# Patient Record
Sex: MALE | Race: WHITE | NOT HISPANIC OR LATINO | Employment: FULL TIME | URBAN - METROPOLITAN AREA
[De-identification: names, ages, dates, MRNs, and addresses within clinical notes are randomized per-mention and may not be internally consistent; named-entity substitution may affect disease eponyms.]

---

## 2019-09-09 ENCOUNTER — APPOINTMENT (OUTPATIENT)
Dept: ULTRASOUND IMAGING | Facility: CLINIC | Age: 29
End: 2019-09-09
Attending: EMERGENCY MEDICINE
Payer: MEDICAID

## 2019-09-09 ENCOUNTER — HOSPITAL ENCOUNTER (EMERGENCY)
Facility: CLINIC | Age: 29
Discharge: HOME | End: 2019-09-09
Attending: EMERGENCY MEDICINE | Admitting: EMERGENCY MEDICINE
Payer: MEDICAID

## 2019-09-09 VITALS
HEART RATE: 73 BPM | RESPIRATION RATE: 16 BRPM | OXYGEN SATURATION: 95 % | WEIGHT: 186.1 LBS | DIASTOLIC BLOOD PRESSURE: 73 MMHG | TEMPERATURE: 98.6 F | HEIGHT: 75 IN | BODY MASS INDEX: 23.14 KG/M2 | SYSTOLIC BLOOD PRESSURE: 134 MMHG

## 2019-09-09 DIAGNOSIS — L03.113 CELLULITIS OF RIGHT UPPER EXTREMITY: ICD-10-CM

## 2019-09-09 LAB
ANION GAP SERPL CALCULATED.3IONS-SCNC: 6 MMOL/L (ref 3–14)
BASOPHILS # BLD AUTO: 0.1 10E9/L (ref 0–0.2)
BASOPHILS NFR BLD AUTO: 0.8 %
BUN SERPL-MCNC: 29 MG/DL (ref 7–30)
CALCIUM SERPL-MCNC: 9 MG/DL (ref 8.5–10.1)
CHLORIDE SERPL-SCNC: 109 MMOL/L (ref 94–109)
CO2 SERPL-SCNC: 27 MMOL/L (ref 20–32)
CREAT SERPL-MCNC: 1.01 MG/DL (ref 0.66–1.25)
CRP SERPL-MCNC: <2.9 MG/L (ref 0–8)
DIFFERENTIAL METHOD BLD: ABNORMAL
EOSINOPHIL # BLD AUTO: 0.1 10E9/L (ref 0–0.7)
EOSINOPHIL NFR BLD AUTO: 1.2 %
ERYTHROCYTE [DISTWIDTH] IN BLOOD BY AUTOMATED COUNT: 13.4 % (ref 10–15)
GFR SERPL CREATININE-BSD FRML MDRD: >90 ML/MIN/{1.73_M2}
GLUCOSE SERPL-MCNC: 91 MG/DL (ref 70–99)
HCT VFR BLD AUTO: 40.7 % (ref 40–53)
HGB BLD-MCNC: 13.3 G/DL (ref 13.3–17.7)
IMM GRANULOCYTES # BLD: 0 10E9/L (ref 0–0.4)
IMM GRANULOCYTES NFR BLD: 0.4 %
LACTATE BLD-SCNC: 1 MMOL/L (ref 0.7–2)
LYMPHOCYTES # BLD AUTO: 1.2 10E9/L (ref 0.8–5.3)
LYMPHOCYTES NFR BLD AUTO: 12.1 %
MCH RBC QN AUTO: 31 PG (ref 26.5–33)
MCHC RBC AUTO-ENTMCNC: 32.7 G/DL (ref 31.5–36.5)
MCV RBC AUTO: 95 FL (ref 78–100)
MONOCYTES # BLD AUTO: 0.9 10E9/L (ref 0–1.3)
MONOCYTES NFR BLD AUTO: 9.3 %
NEUTROPHILS # BLD AUTO: 7.6 10E9/L (ref 1.6–8.3)
NEUTROPHILS NFR BLD AUTO: 76.2 %
NRBC # BLD AUTO: 0 10*3/UL
NRBC BLD AUTO-RTO: 0 /100
PLATELET # BLD AUTO: 287 10E9/L (ref 150–450)
POTASSIUM SERPL-SCNC: 3.8 MMOL/L (ref 3.4–5.3)
RBC # BLD AUTO: 4.29 10E12/L (ref 4.4–5.9)
SODIUM SERPL-SCNC: 142 MMOL/L (ref 133–144)
WBC # BLD AUTO: 10 10E9/L (ref 4–11)

## 2019-09-09 PROCEDURE — 80048 BASIC METABOLIC PNL TOTAL CA: CPT | Performed by: EMERGENCY MEDICINE

## 2019-09-09 PROCEDURE — 99283 EMERGENCY DEPT VISIT LOW MDM: CPT | Mod: Z6 | Performed by: EMERGENCY MEDICINE

## 2019-09-09 PROCEDURE — 86140 C-REACTIVE PROTEIN: CPT | Performed by: EMERGENCY MEDICINE

## 2019-09-09 PROCEDURE — 93971 EXTREMITY STUDY: CPT | Mod: RT

## 2019-09-09 PROCEDURE — 85025 COMPLETE CBC W/AUTO DIFF WBC: CPT | Performed by: EMERGENCY MEDICINE

## 2019-09-09 PROCEDURE — 83605 ASSAY OF LACTIC ACID: CPT | Performed by: EMERGENCY MEDICINE

## 2019-09-09 PROCEDURE — 99284 EMERGENCY DEPT VISIT MOD MDM: CPT | Mod: 25 | Performed by: EMERGENCY MEDICINE

## 2019-09-09 RX ORDER — CLINDAMYCIN HCL 300 MG
300 CAPSULE ORAL 4 TIMES DAILY
Qty: 40 CAPSULE | Refills: 0 | Status: SHIPPED | OUTPATIENT
Start: 2019-09-09 | End: 2019-09-19

## 2019-09-09 ASSESSMENT — MIFFLIN-ST. JEOR: SCORE: 1889.77

## 2019-09-09 ASSESSMENT — ENCOUNTER SYMPTOMS
COLOR CHANGE: 1
FEVER: 0
WOUND: 1
SHORTNESS OF BREATH: 0
COUGH: 0
CHILLS: 0
JOINT SWELLING: 0

## 2019-09-09 NOTE — ED TRIAGE NOTES
"Triage Assessment & Note:    BP (!) 153/84   Pulse 70   Temp 98.6  F (37  C) (Oral)   Resp 18   Ht 1.905 m (6' 3\")   Wt 84.4 kg (186 lb 1.6 oz)   SpO2 95%   BMI 23.26 kg/m      Patient presents with: PT C/o pain,swelling, redness to right fore arm after a small scratch yesterday. PT does have a hx cellulitis in his left arm distant history.     Home Treatments/Remedies: None    Febrile / Afebrile? Afebrile     Duration of C/o:  1 days     Tacos Burton RN  September 9, 2019      "

## 2019-09-09 NOTE — DISCHARGE INSTRUCTIONS
Please make an appointment to follow up with [facility] (phone: [phone] or [facility] (phone: [phone]) in 10 days as needed.    Take clindamycin as prescribed for infection.  If you have any worsening symptoms including increased redness, fevers, vomiting, joint pain or other concerns, return to the emergency department for re-evaluation.

## 2019-09-09 NOTE — ED PROVIDER NOTES
[place] EMERGENCY DEPARTMENT ([facility])  9/09/19 Vertical Triage B 12:00 PM   History     Chief Complaint   Patient presents with     Wound Infection     The history is provided by the patient and medical records.     Lance Pacheco is a 29 year old male who presents with right upper extremity swelling and pain that started last night. Patient states he was helping to clear a parking lot of debris and may have gotten scratched by some sticks and twigs. He has a superficial scratch on his forearm with this.  When he woke up this morning he noticed a large palm sized area of redness and swelling over the distal dorsal surface his right forearm. He notes prior history of staph infection in the past for which he was treated with IV antibiotics and states this is feeling similar.  The staph infection was treated in a healthcare facility on the [facility]. No tick bites. No fevers, trauma, chest pain or shortness of breath.  Denies any pain in the wrist or elbow with movement or decreased ROM.  He does have a prior history of IV drug use but denies any recent use or injections in this area.    Per [facility] records his last Tdap was in 2013.    I have reviewed the Medications, Allergies, Past Medical and Surgical History, and Social History in the indidebt system.  Past Medical History:   Diagnosis Date     Depressive disorder      Night terrors      Sleep disorder      Substance abuse     Treatment right before 12/25/12.       Past Surgical History:   Procedure Laterality Date     ENT SURGERY  2009    abcessed tooth     ORTHOPEDIC SURGERY  2010    Right wrist       No family history on file.    Social History     Tobacco Use     Smoking status: Current Every Day Smoker     Packs/day: 1.00     Smokeless tobacco: Former User   Substance Use Topics     Alcohol use: Yes     Comment: Occasional  2 drinks per week      Review of Systems   Constitutional: Negative for chills and fever.   Respiratory: Negative for cough  "and shortness of breath.    Cardiovascular: Negative for chest pain.   Musculoskeletal: Negative for joint swelling.   Skin: Positive for color change and wound.   All other systems reviewed and are negative.      Physical Exam   BP: (!) 153/84  Pulse: 70  Temp: 98.6  F (37  C)  Resp: 18  Height: 190.5 cm (6' 3\")  Weight: 84.4 kg (186 lb 1.6 oz)  SpO2: 95 %      Physical Exam   Musculoskeletal:        Arms:    General: patient is alert and oriented and in no acute distress   Head: atraumatic and normocephalic   EENT: moist mucus membranes, pupils round and reactive, sclera anicteric  Neck: supple   Cardiovascular: regular rate and rhythm, extremities warm and well perfused, no lower extremity edema, 2+ radial pulses  Pulmonary: No respiratory distress or increased work of breathing   musculoskeletal: Full range of motion of the digits of the right hand, wrist and elbow, no joint swelling redness or warmth  Neurological: alert and oriented, moving all extremities symmetrically, gait normal   Skin: warm, dry, small area of erythema along the dorsal surface of the right forearm with associated swelling and small scab    ED Course        Procedures             Critical Care time:  none             Labs Ordered and Resulted from Time of ED Arrival Up to the Time of Departure from the ED - No data to display         Assessments & Plan (with Medical Decision Making)   Mr. Pacheco is a right-hand-dominant, otherwise healthy 29 year old male who presents with right forearm redness and swelling.  He is hemodynamically stable and afebrile.  He does have full range of motion of the wrist and elbow without evidence of septic arthritis.  On exam he does have a very small abrasion in the right forearm and most likely secondary cellulitis.  He does have a history of IV drug use but denies any IV drug use or injections at this site.  He is otherwise feeling well without systemic signs of infection.  White count is within normal " limits with no elevation in CRP or lactate.  He did go for an ultrasound which shows no evidence of thrombus or abscess.  On chart review he does have a history of MRSA which was susceptible to clindamycin.  Will plan to treat with oral clindamycin.  Area of redness outlined and patient given close return precautions for any worsening symptoms and voiced understanding.         I have reviewed the nursing notes.    I have reviewed the findings, diagnosis, plan and need for follow up with the patient.    New Prescriptions    CLINDAMYCIN (CLEOCIN) 300 MG CAPSULE    Take 1 capsule (300 mg) by mouth 4 times daily for 10 days       Final diagnoses:   Cellulitis of right upper extremity     I, Ana Paul, am serving as a trained medical scribe to document services personally performed by Nasra Howard MD based on the provider's statements to me on September 9, 2019.  This document has been checked and approved by the attending provider.    I, Nasra Howard MD, was physically present and have reviewed and verified the accuracy of this note documented by Ana Paul, medical scribe.         9/9/2019   [facility], [place], EMERGENCY DEPARTMENT     Nasra Howard MD  09/09/19 1410       Nasra Howard MD  09/09/19 1411

## 2019-09-09 NOTE — ED AVS SNAPSHOT
[facility], [place], Emergency Department  [address] [place] [address]  Phone:  [phone]                                    Lance Pacheco   MRN: [id number]    Department:  [facility], Emergency Department   Date of Visit:  9/9/2019           After Visit Summary Signature Page    I have received my discharge instructions, and my questions have been answered. I have discussed any challenges I see with this plan with the nurse or doctor.    ..........................................................................................................................................  Patient/Patient Representative Signature      ..........................................................................................................................................  Patient Representative Print Name and Relationship to Patient    ..................................................               ................................................  Date                                   Time    ..........................................................................................................................................  Reviewed by Signature/Title    ...................................................              ..............................................  Date                                               Time          22EPIC Rev 08/18

## 2019-09-10 ENCOUNTER — NURSE TRIAGE (OUTPATIENT)
Dept: NURSING | Facility: CLINIC | Age: 29
End: 2019-09-10

## 2019-09-10 ENCOUNTER — HOSPITAL ENCOUNTER (EMERGENCY)
Facility: CLINIC | Age: 29
Discharge: HOME | End: 2019-09-10
Attending: EMERGENCY MEDICINE | Admitting: EMERGENCY MEDICINE
Payer: MEDICAID

## 2019-09-10 VITALS
DIASTOLIC BLOOD PRESSURE: 75 MMHG | HEIGHT: 75 IN | TEMPERATURE: 99.3 F | BODY MASS INDEX: 22.84 KG/M2 | OXYGEN SATURATION: 96 % | RESPIRATION RATE: 14 BRPM | WEIGHT: 183.7 LBS | SYSTOLIC BLOOD PRESSURE: 127 MMHG

## 2019-09-10 DIAGNOSIS — L03.113 CELLULITIS OF RIGHT UPPER EXTREMITY: ICD-10-CM

## 2019-09-10 PROCEDURE — 25000132 ZZH RX MED GY IP 250 OP 250 PS 637: Performed by: EMERGENCY MEDICINE

## 2019-09-10 PROCEDURE — 99283 EMERGENCY DEPT VISIT LOW MDM: CPT | Performed by: EMERGENCY MEDICINE

## 2019-09-10 PROCEDURE — 99284 EMERGENCY DEPT VISIT MOD MDM: CPT | Mod: Z6 | Performed by: EMERGENCY MEDICINE

## 2019-09-10 RX ORDER — CLINDAMYCIN HCL 300 MG
300 CAPSULE ORAL ONCE
Status: COMPLETED | OUTPATIENT
Start: 2019-09-10 | End: 2019-09-10

## 2019-09-10 RX ORDER — IBUPROFEN 600 MG/1
600 TABLET, FILM COATED ORAL ONCE
Status: COMPLETED | OUTPATIENT
Start: 2019-09-10 | End: 2019-09-10

## 2019-09-10 RX ADMIN — CLINDAMYCIN HYDROCHLORIDE 300 MG: 300 CAPSULE ORAL at 12:08

## 2019-09-10 RX ADMIN — IBUPROFEN 600 MG: 600 TABLET ORAL at 12:08

## 2019-09-10 ASSESSMENT — ENCOUNTER SYMPTOMS: COLOR CHANGE: 1

## 2019-09-10 ASSESSMENT — MIFFLIN-ST. JEOR: SCORE: 1878.89

## 2019-09-10 NOTE — DISCHARGE INSTRUCTIONS
Take clindamycin as directed.    Return to the emergency department for worsening or any other problems.

## 2019-09-10 NOTE — TELEPHONE ENCOUNTER
Reason for Disposition    Health Information question, no triage required and triager able to answer question    Additional Information    Negative: [1] Caller is not with the adult (patient) AND [2] reporting urgent symptoms    Negative: Lab result questions    Negative: Medication questions    Negative: Caller can't be reached by phone    Negative: Caller has already spoken to PCP or another triager    Negative: RN needs further essential information from caller in order to complete triage    Negative: Requesting regular office appointment    Negative: [1] Caller requesting NON-URGENT health information AND [2] PCP's office is the best resource    Protocols used: INFORMATION ONLY CALL-A-

## 2019-09-10 NOTE — PROGRESS NOTES
Emergency Social Work Services Note    Date of  Intervention: 09/10/19  Last Emergency Department Visit: Yesterday 9/9/2019  Care Plan: None  Collaborated with: EDMD, patient, pharmacy staff    Data: Lance Ariza is a 29-year-old male recently released from custody who came to the ED yesterday.  He was discharged from ED with antibiotic prescription but states he was unable to fill this secondary to cost.    Intervention: TIARA met with Lance for discussion regarding his situation.  He notes he was recently released from custody and was supposed to be released to a community facility.  He notes his MA is currently inactive but he is in the process of completing paperwork to reinstate it.  He does not have any cash and he is essentially homeless until he figures out next placement .  Agreed to cover cost of antibiotic this 1 time due to lapsed insurance as it is medically necessary medication.  SW spoke to pharmacy staff who noted cost out-of-pocket would be $27.54.  Agreed to use social work funds to cover this cost.    Assessment:  Recent released from custody, providers, insurance has lapsed    Plan:    Anticipated Disposition:  Home, no needs identified    Barriers to d/c plan:  none    Follow Up:  Lance will  antibiotic from our pharmacy, cost has been covered.  He will follow-up with his community providers, notes he has transport once he leaves ED.     MARISOL Cardenas, [facility]  Social Work Services, Emergency Dept [facility]  Pager: [phone] Mon-Sat 9 am - 9 pm, on-call/after hours pager [phone]    This note was created in part by the use of Dragon voice recognition dictation system. Inadvertent grammatical errors and typographical errors may still exist.

## 2019-09-10 NOTE — TELEPHONE ENCOUNTER
Pt states he was seen in the ED yesterday, was prescribed an oral abx that he can't pay for, he states he can't pay anything out of pocket and plans to seek care in the ED to see if they can give him an injection/IV medication.      Pt requested that writer call the [place] ED and notify them of his upcoming arrival.  Writer called them at 11:02AM and gave the ED staff his information.  No further assistance requested.     Jaycee Rondon RN/FNA

## 2019-09-10 NOTE — ED PROVIDER NOTES
[place] EMERGENCY DEPARTMENT ([facility])  September 10, 2019    History     Chief Complaint   Patient presents with     Arm Pain     Swollen/painful LUE     The history is provided by the patient and medical records.     Lance Pacheco is a 29 year old right-hand-dominant male who presents to the ED for evaluation of increased swelling and redness on his right forearm. Patient reports he was diagnosed yesterday with cellulitis secondary to a small abrasion on his right forearm. He states he was not given antibiotics in the ED, and has not had any antibiotics for his infection because he can't afford it. Here, he reports the swelling and redness has spread on his right forearm.     PAST MEDICAL HISTORY  Past Medical History:   Diagnosis Date     Depressive disorder      Night terrors      Sleep disorder      Substance abuse (H)     Treatment right before 12/25/12.     PAST SURGICAL HISTORY  Past Surgical History:   Procedure Laterality Date     ENT SURGERY  2009    abcessed tooth     ORTHOPEDIC SURGERY  2010    Right wrist     FAMILY HISTORY  No family history on file.  SOCIAL HISTORY  Social History     Tobacco Use     Smoking status: Current Every Day Smoker     Packs/day: 1.00     Smokeless tobacco: Former User   Substance Use Topics     Alcohol use: Yes     Comment: Occasional  2 drinks per week     MEDICATIONS  No current facility-administered medications for this encounter.      Current Outpatient Medications   Medication     clindamycin (CLEOCIN) 300 MG capsule     GABAPENTIN PO     HYDROXYZINE PAMOATE PO     PRAZOSIN HCL PO     SERTRALINE HCL PO     TOPIRAMATE PO     ALLERGIES  No Known Allergies    I have reviewed the Medications, Allergies, Past Medical and Surgical History, and Social History in the Epic system.    Review of Systems   Musculoskeletal:        Positive for right forearm swelling.   Skin: Positive for color change (redness).   All other systems reviewed and are  "negative.      Physical Exam   BP: 127/75  Heart Rate: 71  Temp: 99.3  F (37.4  C)  Resp: 14  Height: 190.5 cm (6' 3\")  Weight: 83.3 kg (183 lb 11.2 oz)  SpO2: 96 %      Physical Exam   Constitutional: He is oriented to person, place, and time. He appears well-developed and well-nourished.  Non-toxic appearance. He does not appear ill. No distress.   Patient is awake and alert, no acute distress.  He is mentating normally and protecting his airway without difficulty.   HENT:   Head: Normocephalic and atraumatic.   Eyes: Pupils are equal, round, and reactive to light. EOM are normal. No scleral icterus.   Neck: Normal range of motion. Neck supple.   Cardiovascular: Normal rate.   Pulmonary/Chest: Effort normal. No respiratory distress.   Neurological: He is alert and oriented to person, place, and time. He has normal strength. No sensory deficit.   Skin: Skin is warm and dry. No rash noted. There is erythema. No pallor.        Psychiatric: He has a normal mood and affect. His behavior is normal.   Nursing note and vitals reviewed.      ED Course        Procedures               Assessments & Plan (with Medical Decision Making)   This patient presented to the emergency department with worsening cellulitis of arm.  He does not appear systemically ill and that suspect it is worsening because the patient has not started taking his antibiotics because he could not afford them.  Patient was given a dose of clindamycin here and I did work with ED Social Work to make sure that the patient's medications were provided.  He was then discharged and told to continue taking antibiotics as directed.    This part of the document was transcribed by Ana Paul Medical Scribe.      I have reviewed the nursing notes.    I have reviewed the findings, diagnosis, plan and need for follow up with the patient.    Discharge Medication List as of 9/10/2019 12:21 PM          Final diagnoses:   Cellulitis of right upper extremity     I, " Gwen Black, am serving as a trained medical scribe to document services personally performed by Danny Perkins MD, based on the provider's statements to me.      I, Danny Perkins MD, was physically present and have reviewed and verified the accuracy of this note documented by Gwen Black.     9/10/2019   [facility], [place], EMERGENCY DEPARTMENT     Danny Perkins MD  09/12/19 0951

## 2019-09-10 NOTE — ED AVS SNAPSHOT
[facility], [place], Emergency Department  [address] [place] [address]  Phone:  [phone]                                    Lance Pacheco   MRN: [id number]    Department:  [facility], Emergency Department   Date of Visit:  9/10/2019           After Visit Summary Signature Page    I have received my discharge instructions, and my questions have been answered. I have discussed any challenges I see with this plan with the nurse or doctor.    ..........................................................................................................................................  Patient/Patient Representative Signature      ..........................................................................................................................................  Patient Representative Print Name and Relationship to Patient    ..................................................               ................................................  Date                                   Time    ..........................................................................................................................................  Reviewed by Signature/Title    ...................................................              ..............................................  Date                                               Time          22EPIC Rev 08/18

## 2020-12-30 ENCOUNTER — ALLIED HEALTH/NURSE VISIT (OUTPATIENT)
Dept: FAMILY MEDICINE | Facility: OTHER | Age: 30
End: 2020-12-30
Attending: FAMILY MEDICINE
Payer: COMMERCIAL

## 2020-12-30 DIAGNOSIS — Z29.9 PREVENTIVE MEASURE: Primary | ICD-10-CM

## 2020-12-30 PROCEDURE — U0003 INFECTIOUS AGENT DETECTION BY NUCLEIC ACID (DNA OR RNA); SEVERE ACUTE RESPIRATORY SYNDROME CORONAVIRUS 2 (SARS-COV-2) (CORONAVIRUS DISEASE [COVID-19]), AMPLIFIED PROBE TECHNIQUE, MAKING USE OF HIGH THROUGHPUT TECHNOLOGIES AS DESCRIBED BY CMS-2020-01-R: HCPCS | Mod: ZL | Performed by: FAMILY MEDICINE

## 2020-12-30 PROCEDURE — C9803 HOPD COVID-19 SPEC COLLECT: HCPCS

## 2020-12-30 NOTE — NURSING NOTE
Asymptomatic - [facility]  Patient swabbed for COVID-19 testing.  Keesha Lin LPN on 12/30/2020 at 12:32 PM

## 2021-01-01 LAB
SARS-COV-2 RNA SPEC QL NAA+PROBE: NOT DETECTED
SPECIMEN SOURCE: NORMAL

## 2021-01-06 NOTE — PROGRESS NOTES
Nursing Notes:   Samantha Buitrago LPN  1/7/2021 12:55 PM  Signed  Patient presents to clinic for intake physical for [facility].  Samantha Buitrago LPN ....................  1/7/2021   12:47 PM        HPI: Lance Pacheco who presents for an intake physical for [facility].     Arrived at [facility] last Wednesday and has been going well so far. In treatment for history of methamphetamine abuse. Most recently used on 12/13/2020. Also history of heroin, IV drug use. States has had full blood work up since last used IV drugs and all was negative. Has been in treatment a few other times. Mostly in treatment since last last September but did have a relapse as noted above so now has new Rule 25.     History of PTSD due to girlfriend overdosing on meth in front of him several months ago. Struggles with nightmares. History of anxiety and was previously on Zoloft, weaned himself off in 01/2020. Does well with cannabis, working towards obtaining legal certification through mental health once done with current treatment. Referral has already been placed to [facility] for this. Reviewed note from 09/22/2020 that outlines this in more detail. Patient would like to meet with mental health provider in the mean time to discuss medication options.     No other acute concerns today.     STD concerns: No  Cholesterol/DM concerns/screening: Not due  Prostate cancer screening discussed:  Not indicated, patient is average risk and younger than 50.  Family history of colon or prostate CA?: No  Colonoscopy: Not indicated, patient is average risk and younger than 50.  Tobacco use: Pack a day of cigarettes  Immunizations: UTD    Patient Active Problem List    Diagnosis Date Noted     Intoxication 10/31/2010     Priority: Medium     Closed fracture of navicular (scaphoid) bone of wrist 05/20/2010     Priority: Medium     IMO Update 10/11       Sprain of acromioclavicular ligament 05/17/2007     Priority: Medium     " IMO Update 10/11  IMO Update       Migraine 08/12/2003     Priority: Medium       Past Medical History:   Diagnosis Date     Depressive disorder      Night terrors      Sleep disorder      Substance abuse (H)     Treatment right before 12/25/12.       Past Surgical History:   Procedure Laterality Date     ENT SURGERY  2009    abcessed tooth     ORTHOPEDIC SURGERY  2010    Right wrist       No family history on file.    Social History     Tobacco Use     Smoking status: Current Every Day Smoker     Packs/day: 1.00     Smokeless tobacco: Former User   Substance Use Topics     Alcohol use: Yes     Comment: Occasional  2 drinks per week       No current outpatient medications on file.       No Known Allergies    REVIEW OF SYSTEMS:  Refer to HPI.    Physical Exam:  /76   Pulse 86   Temp 97.8  F (36.6  C)   Resp 14   Ht 1.905 m (6' 3\")   Wt 81 kg (178 lb 9.6 oz)   SpO2 96%   BMI 22.32 kg/m     CONSTITUTIONAL:  Alert, cooperative, NAD.  HEAD:  Normal. Normocephalic, atraumatic.  EYES:  Normal external eye, conjunctiva, lids.  No scleral icterus.  ENT/MOUTH:  External ears and nose normal.  TMs normal.  Moist mucous membranes. Oropharynx clear.   ENDO: No thyromegaly or thyroid nodules.  LYMPH:  Nocervical or supraclavicular LA.    CARDIOVASCULAR: Regular, S1, S2.  No S3 or S4.  No murmur/gallop/rub.  No peripheral edema.  RESPIRATORY: CTA bilaterally, no wheezes, rhonchi or rales.  GI: Bowel sounds wnl. Soft, nontender, nondistended.  No masses or HSM.  No rebound or guarding.  MSKEL: Grossly normal ROM.  No clubbing.  INTEGUMENTARY:  Warm, dry.  No rash noted on exposed skin.  NEUROLOGIC:  Facies symmetric.  Grossly normal movement and tone.  No tremor.  PSYCHIATRIC:  Affect normal.  Speech fluent.       PHQ Depression Screen  PHQ-9 SCORE 1/7/2021   PHQ-9 Total Score 10       Labs  No results found for any visits on 01/07/21.       ASSESSMENT/PLAN:    ICD-10-CM    1. Routine history and physical examination " of adult  Z00.00    2. Methamphetamine abuse (H)  F15.10    3. PTSD (post-traumatic stress disorder)  F43.10 MENTAL HEALTH REFERRAL  - Adult; Psychiatry; Psychiatry; [facility]: Psychiatry Clinic [phone]; We will contact you to schedule the appointment or please call with any questions   4. Anxiety  F41.9 MENTAL HEALTH REFERRAL  - Adult; Psychiatry; Psychiatry; [facility]: Psychiatry Clinic [phone]; We will contact you to schedule the appointment or please call with any questions     1, 2. Colon and prostate cancer screening discussed and is not indicated due to patient health status and age.  Counseled on healthy diet and exercise. Encouraged tobacco cessation, patient is not ready at this time. Patient declined lab work today. UTD on immunizations. Follow up as needed.     3, 4. Mental health referral placed for additional evaluation and treatment consideration. Patient will schedule appointment at his convenience.         Analia Cadet PA-C

## 2021-01-07 ENCOUNTER — OFFICE VISIT (OUTPATIENT)
Dept: FAMILY MEDICINE | Facility: OTHER | Age: 31
End: 2021-01-07
Attending: PHYSICIAN ASSISTANT
Payer: COMMERCIAL

## 2021-01-07 VITALS
HEIGHT: 75 IN | DIASTOLIC BLOOD PRESSURE: 76 MMHG | BODY MASS INDEX: 22.21 KG/M2 | TEMPERATURE: 97.8 F | OXYGEN SATURATION: 96 % | WEIGHT: 178.6 LBS | SYSTOLIC BLOOD PRESSURE: 138 MMHG | HEART RATE: 86 BPM | RESPIRATION RATE: 14 BRPM

## 2021-01-07 DIAGNOSIS — F41.9 ANXIETY: ICD-10-CM

## 2021-01-07 DIAGNOSIS — F15.10 METHAMPHETAMINE ABUSE (H): ICD-10-CM

## 2021-01-07 DIAGNOSIS — F43.10 PTSD (POST-TRAUMATIC STRESS DISORDER): ICD-10-CM

## 2021-01-07 DIAGNOSIS — Z00.00 ROUTINE HISTORY AND PHYSICAL EXAMINATION OF ADULT: Primary | ICD-10-CM

## 2021-01-07 PROCEDURE — G0463 HOSPITAL OUTPT CLINIC VISIT: HCPCS

## 2021-01-07 PROCEDURE — 99395 PREV VISIT EST AGE 18-39: CPT | Performed by: PHYSICIAN ASSISTANT

## 2021-01-07 ASSESSMENT — PATIENT HEALTH QUESTIONNAIRE - PHQ9
SUM OF ALL RESPONSES TO PHQ QUESTIONS 1-9: 10
5. POOR APPETITE OR OVEREATING: MORE THAN HALF THE DAYS

## 2021-01-07 ASSESSMENT — MIFFLIN-ST. JEOR: SCORE: 1850.75

## 2021-01-07 ASSESSMENT — ANXIETY QUESTIONNAIRES
2. NOT BEING ABLE TO STOP OR CONTROL WORRYING: SEVERAL DAYS
5. BEING SO RESTLESS THAT IT IS HARD TO SIT STILL: SEVERAL DAYS
6. BECOMING EASILY ANNOYED OR IRRITABLE: SEVERAL DAYS
3. WORRYING TOO MUCH ABOUT DIFFERENT THINGS: SEVERAL DAYS
IF YOU CHECKED OFF ANY PROBLEMS ON THIS QUESTIONNAIRE, HOW DIFFICULT HAVE THESE PROBLEMS MADE IT FOR YOU TO DO YOUR WORK, TAKE CARE OF THINGS AT HOME, OR GET ALONG WITH OTHER PEOPLE: SOMEWHAT DIFFICULT
7. FEELING AFRAID AS IF SOMETHING AWFUL MIGHT HAPPEN: NOT AT ALL
GAD7 TOTAL SCORE: 8
1. FEELING NERVOUS, ANXIOUS, OR ON EDGE: MORE THAN HALF THE DAYS

## 2021-01-07 ASSESSMENT — PAIN SCALES - GENERAL: PAINLEVEL: NO PAIN (0)

## 2021-01-07 NOTE — NURSING NOTE
Patient presents to clinic for intake physical for [facility].  Samantha Buitrago LPN ....................  1/7/2021   12:47 PM

## 2021-01-08 ENCOUNTER — OFFICE VISIT (OUTPATIENT)
Dept: FAMILY MEDICINE | Facility: OTHER | Age: 31
End: 2021-01-08
Attending: PSYCHIATRY & NEUROLOGY
Payer: COMMERCIAL

## 2021-01-08 VITALS
HEART RATE: 72 BPM | OXYGEN SATURATION: 97 % | BODY MASS INDEX: 22.03 KG/M2 | DIASTOLIC BLOOD PRESSURE: 76 MMHG | WEIGHT: 177.2 LBS | TEMPERATURE: 97.6 F | SYSTOLIC BLOOD PRESSURE: 130 MMHG | HEIGHT: 75 IN | RESPIRATION RATE: 20 BRPM

## 2021-01-08 DIAGNOSIS — F43.10 PTSD (POST-TRAUMATIC STRESS DISORDER): ICD-10-CM

## 2021-01-08 DIAGNOSIS — F41.9 ANXIETY: ICD-10-CM

## 2021-01-08 PROCEDURE — 99204 OFFICE O/P NEW MOD 45 MIN: CPT | Performed by: PSYCHIATRY & NEUROLOGY

## 2021-01-08 PROCEDURE — G0463 HOSPITAL OUTPT CLINIC VISIT: HCPCS

## 2021-01-08 RX ORDER — HYDROXYZINE PAMOATE 50 MG/1
50 CAPSULE ORAL
Qty: 30 CAPSULE | Refills: 1 | Status: SHIPPED | OUTPATIENT
Start: 2021-01-08 | End: 2021-03-20

## 2021-01-08 RX ORDER — HYDROXYZINE PAMOATE 25 MG/1
25 CAPSULE ORAL 3 TIMES DAILY PRN
Qty: 90 CAPSULE | Refills: 1 | Status: SHIPPED | OUTPATIENT
Start: 2021-01-08 | End: 2021-03-20

## 2021-01-08 RX ORDER — TOPIRAMATE 50 MG/1
50 TABLET, FILM COATED ORAL AT BEDTIME
Qty: 30 TABLET | Refills: 1 | Status: SHIPPED | OUTPATIENT
Start: 2021-01-08 | End: 2021-03-20

## 2021-01-08 RX ORDER — CITALOPRAM HYDROBROMIDE 20 MG/1
20 TABLET ORAL
Qty: 30 TABLET | Refills: 1 | Status: SHIPPED | OUTPATIENT
Start: 2021-01-08 | End: 2021-03-20

## 2021-01-08 SDOH — HEALTH STABILITY: MENTAL HEALTH: HOW OFTEN DO YOU HAVE A DRINK CONTAINING ALCOHOL?: NOT ASKED

## 2021-01-08 SDOH — HEALTH STABILITY: MENTAL HEALTH: HOW OFTEN DO YOU HAVE 6 OR MORE DRINKS ON ONE OCCASION?: NOT ASKED

## 2021-01-08 SDOH — HEALTH STABILITY: MENTAL HEALTH: HOW MANY STANDARD DRINKS CONTAINING ALCOHOL DO YOU HAVE ON A TYPICAL DAY?: NOT ASKED

## 2021-01-08 ASSESSMENT — PATIENT HEALTH QUESTIONNAIRE - PHQ9
5. POOR APPETITE OR OVEREATING: NEARLY EVERY DAY
SUM OF ALL RESPONSES TO PHQ QUESTIONS 1-9: 16

## 2021-01-08 ASSESSMENT — ANXIETY QUESTIONNAIRES
2. NOT BEING ABLE TO STOP OR CONTROL WORRYING: NEARLY EVERY DAY
GAD7 TOTAL SCORE: 8
7. FEELING AFRAID AS IF SOMETHING AWFUL MIGHT HAPPEN: SEVERAL DAYS
1. FEELING NERVOUS, ANXIOUS, OR ON EDGE: NEARLY EVERY DAY
5. BEING SO RESTLESS THAT IT IS HARD TO SIT STILL: SEVERAL DAYS
3. WORRYING TOO MUCH ABOUT DIFFERENT THINGS: SEVERAL DAYS
IF YOU CHECKED OFF ANY PROBLEMS ON THIS QUESTIONNAIRE, HOW DIFFICULT HAVE THESE PROBLEMS MADE IT FOR YOU TO DO YOUR WORK, TAKE CARE OF THINGS AT HOME, OR GET ALONG WITH OTHER PEOPLE: SOMEWHAT DIFFICULT
GAD7 TOTAL SCORE: 13
6. BECOMING EASILY ANNOYED OR IRRITABLE: SEVERAL DAYS

## 2021-01-08 ASSESSMENT — MIFFLIN-ST. JEOR: SCORE: 1844.4

## 2021-01-08 NOTE — PROGRESS NOTES
"Outpatient Psychiatric Diagnostic Evaluation    Name: Lance Pacheco      : 1990   Date: 2021    Source of Referral:  Analia Cadet PA-C    Identifying Data:  This is a 30-year-old man currently residing at [facility] who is seen for psychiatric diagnostic assessment and treatment    Chief Complaint:   Patient presents with:  Consult     \"Issues with mild depression and anxiety\"    HPI:  Patient has a long history of substance use issues and is currently in [facility] residential program for relapse of methamphetamine use disorder.  He says that he is doing well in the program saying \"I love it\".  However he does report problems with anxiety, low level depression, and PTSD issues.  He is currently not on any psychiatric medications    Psychiatric Review of Symptoms:  Anxiety, trouble falling asleep, nightmares, flashback dreams, daytime flashbacks and reports of sleep paralysis    Psychiatric History:  Patient was treated with Zoloft for anxiety over a year ago and reports some antianxiety benefit.  However he complained of unpleasant side effects and eventually stopped it on his own around 2020.  Patient denies history of treatment for psychiatric disorders or psychiatric hospitalizations prior to above-mentioned treatment with Zoloft.  He has never been hospitalized for psychiatric reasons.  He reports trials of both Seroquel and trazodone for sleep while he was receiving substance use treatment, but says both of these caused restless leg syndrome and made his sleep worse.  He also had a trial of Topamax for sleep while in a treatment program and says that it helped for a while but then stopped working.  Denied any adverse reactions to the Topamax.  Patient has PTSD symptoms from an experience of seeing his girlfriend overdose in front of him.  He has not had trauma based therapy for this    Chemical Use History:  Tobacco Use     Smoking status: Current Every Day Smoker     " Packs/day: 1.00     Smokeless tobacco: Current User     Types: Chew     Tobacco comment: tin lasts a week   Substance and Sexual Activity     Alcohol use: Not Currently     Comment: Occasional  2 drinks per week     Drug use: Yes     Types: Marijuana, Methamphetamines, IV     Comment: heroin in treatment as of 12/30/2020     Sexual activity: Not Currently     Partners: Female      Patient has a history of substance use disorders including the use of IV heroin and methamphetamine.  He reports substance use treatment a few times in the past.     Past Medical History:  Past Medical History:   Diagnosis Date     Depressive disorder      Night terrors      Sleep disorder      Substance abuse (H)     Treatment right before 12/25/12.      Current Medications  Current Outpatient Medications   Medication     citalopram (CELEXA) 20 MG tablet     hydrOXYzine (VISTARIL) 25 MG capsule     hydrOXYzine (VISTARIL) 50 MG capsule     topiramate (TOPAMAX) 50 MG tablet     No current facility-administered medications for this visit.      Family History:  No family history on file.     Patient says his family has history of substance use problems and his mother had an alcohol use disorder that led to her dying from driving under the influence.    Social History:  Social History     Socioeconomic History     Marital status: Single     Spouse name: Not on file     Number of children: Not on file     Years of education: Not on file     Highest education level: Not on file   Occupational History     Not on file   Social Needs     Financial resource strain: Not on file     Food insecurity     Worry: Not on file     Inability: Not on file     Transportation needs     Medical: Not on file     Non-medical: Not on file   Tobacco Use     Smoking status: Current Every Day Smoker     Packs/day: 1.00     Smokeless tobacco: Current User     Types: Chew     Tobacco comment: tin lasts a week   Substance and Sexual Activity     Alcohol use: Not  Currently     Comment: Occasional  2 drinks per week     Drug use: Yes     Types: Marijuana, Methamphetamines, IV     Comment: heroin in treatment as of 12/30/2020     Sexual activity: Not Currently     Partners: Female   Lifestyle     Physical activity     Days per week: Not on file     Minutes per session: Not on file     Stress: Not on file   Relationships     Social connections     Talks on phone: Not on file     Gets together: Not on file     Attends Cheondoism service: Not on file     Active member of club or organization: Not on file     Attends meetings of clubs or organizations: Not on file     Relationship status: Not on file     Intimate partner violence     Fear of current or ex partner: Not on file     Emotionally abused: Not on file     Physically abused: Not on file     Forced sexual activity: Not on file   Other Topics Concern     Not on file   Social History Narrative    From [place]      Patient attended high school and ultimately got his GED.  He has never been  but has been living with his current girlfriend who is pregnant with his first child.  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  Speech was normal rate, rhythm and volume.  Memory and cognition were grossly intact.  Mood shows mild depression, mild to moderate anxiety.  Affect is full range without lability.  Patient denies suicidal or homicidal ideation.  Thought processes are goal-directed without hallucinations or delusions insight and judgment are adequate    Diagnosis:  PTSD  Anxiety, undesignated  Polysubstance use disorder    Impression/Assessment:  Patient appears to be motivated and doing well in his current substance use treatment program.  However he continues to have significant symptoms from PTSD and anxiety and sleep problems.  We can treat the anxiety and some of the PTSD symptoms with  psychiatric medications, However he will need trauma based individual psychotherapy to fully recover from his PTSD    Treatment Plan:  1.  Trial of citalopram for anxiety and depression, 10 milligrams after dinner for 2 days and then 20 millgrams after dinner daily  2.  Trial of Topamax 50 mg at bedtime for flashback dreams  3   Trial of hydroxyzine 25 mg as needed up to 3 times a day for anxiety (or itching) and 50 mg as needed at night for sleep  4.  Follow-up with Dr. Jack in 2 weeks    Total time spent on day of service 50 minutes-including review of EMR, face-to-face time with the patient, counseling on above issues, prescription of medications in the EMR, discussion of medications including risks/benefits, possible alternatives and possible side effects (this includes written descriptions of titration of medications and documents completed for residential center), and documentation in the EMR    Signed: Joe Jack MD on 1/8/2021 at 1:46 PM

## 2021-01-08 NOTE — NURSING NOTE
"Chief Complaint   Patient presents with     Consult       Initial /76 (BP Location: Right arm, Patient Position: Sitting, Cuff Size: Adult Regular)   Pulse 72   Temp 97.6  F (36.4  C) (Tympanic)   Resp 20   Ht 1.905 m (6' 3\")   Wt 80.4 kg (177 lb 3.2 oz)   SpO2 97%   BMI 22.15 kg/m   Estimated body mass index is 22.15 kg/m  as calculated from the following:    Height as of this encounter: 1.905 m (6' 3\").    Weight as of this encounter: 80.4 kg (177 lb 3.2 oz).  Medication Reconciliation: complete    ABDIRIZAK PETERSON, BRETTN  "

## 2021-01-09 ASSESSMENT — ANXIETY QUESTIONNAIRES: GAD7 TOTAL SCORE: 13

## 2021-01-18 ENCOUNTER — TELEPHONE (OUTPATIENT)
Dept: SCHEDULING | Facility: OTHER | Age: 31
End: 2021-01-18
Payer: COMMERCIAL

## 2021-01-18 NOTE — TELEPHONE ENCOUNTER
Livier from [facility] called to cancel patient's appointment with Dr. Jack on the 25th. She states patient is not interested in taking the medications prescribed and does not want to follow up. She requests a discontinuation order on the citalopram, hydroxyzine, and Topamax. Discontinuation order can be faxed to [phone]. Call with questions [phone].    Roseanne Rolle on 1/18/2021 at 10:16 AM

## 2021-01-19 ENCOUNTER — OFFICE VISIT (OUTPATIENT)
Dept: FAMILY MEDICINE | Facility: OTHER | Age: 31
End: 2021-01-19
Attending: PHYSICIAN ASSISTANT
Payer: COMMERCIAL

## 2021-01-19 ENCOUNTER — HOSPITAL ENCOUNTER (OUTPATIENT)
Dept: GENERAL RADIOLOGY | Facility: OTHER | Age: 31
End: 2021-01-19
Attending: PHYSICIAN ASSISTANT
Payer: COMMERCIAL

## 2021-01-19 ENCOUNTER — HOSPITAL ENCOUNTER (OUTPATIENT)
Dept: MRI IMAGING | Facility: OTHER | Age: 31
End: 2021-01-19
Attending: PHYSICIAN ASSISTANT
Payer: COMMERCIAL

## 2021-01-19 VITALS
SYSTOLIC BLOOD PRESSURE: 134 MMHG | HEART RATE: 84 BPM | OXYGEN SATURATION: 100 % | DIASTOLIC BLOOD PRESSURE: 82 MMHG | RESPIRATION RATE: 12 BRPM | WEIGHT: 178.2 LBS | TEMPERATURE: 97.6 F | BODY MASS INDEX: 22.16 KG/M2 | HEIGHT: 75 IN

## 2021-01-19 DIAGNOSIS — M25.512 ACUTE PAIN OF LEFT SHOULDER: ICD-10-CM

## 2021-01-19 DIAGNOSIS — M25.512 ACUTE PAIN OF LEFT SHOULDER: Primary | ICD-10-CM

## 2021-01-19 DIAGNOSIS — S43.432A LABRAL TEAR OF SHOULDER, LEFT, INITIAL ENCOUNTER: ICD-10-CM

## 2021-01-19 PROCEDURE — G0463 HOSPITAL OUTPT CLINIC VISIT: HCPCS

## 2021-01-19 PROCEDURE — 73221 MRI JOINT UPR EXTREM W/O DYE: CPT | Mod: LT

## 2021-01-19 PROCEDURE — G0463 HOSPITAL OUTPT CLINIC VISIT: HCPCS | Mod: 25

## 2021-01-19 PROCEDURE — 99213 OFFICE O/P EST LOW 20 MIN: CPT | Performed by: PHYSICIAN ASSISTANT

## 2021-01-19 PROCEDURE — 73030 X-RAY EXAM OF SHOULDER: CPT | Mod: LT

## 2021-01-19 ASSESSMENT — PAIN SCALES - GENERAL: PAINLEVEL: MODERATE PAIN (5)

## 2021-01-19 ASSESSMENT — MIFFLIN-ST. JEOR: SCORE: 1848.94

## 2021-01-19 NOTE — NURSING NOTE
Patient presents to clinic with left shoulder pain. He states that years ago he dislocated it and last night he slept wrong on it and had pain today.  Samantha Buitrago LPN ....................  1/19/2021   9:46 AM

## 2021-01-19 NOTE — PROGRESS NOTES
"Nursing Notes:   Samantha Buitrago LPN  1/19/2021  9:47 AM  Sign at exiting of workspace  Patient presents to clinic with left shoulder pain. He states that years ago he dislocated it and last night he slept wrong on it and had pain today.  Samantha Buitrago LPN ....................  1/19/2021   9:46 AM        SUBJECTIVE:     HPI  Lance Pacheco is a 30 year old male who presents to clinic today for evaluation of shoulder concerns. Developed left shoulder pain after sleeping on it wrong last night. States several years ago he dislocated this shoulder, records show sprain of AC ligament. A year after that he dislocated it again while ashleigh jumping. States on and off has had issues with it but was ok for the past 5-6 months. Notes he often hears cracking and \"crinkles\" when he moves his left shoulder. Bothersome with overhead ROM. Has not taken anything yet for symptomatic relief. No numbness or tingling.       Review of Systems   Per HPI.     PAST MEDICAL HISTORY:   Past Medical History:   Diagnosis Date     Depressive disorder      Night terrors      Sleep disorder      Substance abuse (H)     Treatment right before 12/25/12.       PAST SURGICAL HISTORY:   Past Surgical History:   Procedure Laterality Date     ENT SURGERY  2009    abcessed tooth     ORTHOPEDIC SURGERY  2010    Right wrist       FAMILY HISTORY: No family history on file.    SOCIAL HISTORY:   Social History     Tobacco Use     Smoking status: Current Every Day Smoker     Packs/day: 1.00     Smokeless tobacco: Current User     Types: Chew     Tobacco comment: tin lasts a week   Substance Use Topics     Alcohol use: Not Currently     Comment: Occasional  2 drinks per week      No Known Allergies  Current Outpatient Medications   Medication     hydrOXYzine (VISTARIL) 25 MG capsule     hydrOXYzine (VISTARIL) 50 MG capsule     citalopram (CELEXA) 20 MG tablet     topiramate (TOPAMAX) 50 MG tablet     No current facility-administered medications for " "this visit.          OBJECTIVE:     /82   Pulse 84   Temp 97.6  F (36.4  C)   Resp 12   Ht 1.905 m (6' 3\")   Wt 80.8 kg (178 lb 3.2 oz)   SpO2 100%   BMI 22.27 kg/m    Body mass index is 22.27 kg/m .  Physical Exam  General: Pleasant, in no apparent distress.  Cardiovascular: Regular rate and rhythm with S1 equal to S2. No murmurs, friction rubs, or gallops.   Respiratory: Lungs are resonant and clear to auscultation bilaterally. No wheezes, crackles, or rhonchi.  Musculoskeletal: Mild tenderness to posterior right shoulder. Limited overhead ROM of left shoulder compared to right. Pain with cross arm abduction. Apley's test limited by pain. Moderately positive empty can and hawkin's test on left.   Neurologic Exam: Non-focal, symmetric DTRs, normal gross motor, tone coordination and no visible tremor.  Psych: Appropriate mood and affect.      Results for orders placed or performed during the hospital encounter of 01/19/21   XR Shoulder Left G/E 3 Views     Status: None    Narrative    PROCEDURE:  XR SHOULDER LT G/E 3 VW    HISTORY: Acute pain of left shoulder.    COMPARISON:  None.    TECHNIQUE:  3 views left shoulder.    FINDINGS:  Accelerated for age glenohumeral osteoarthritis best seen  as osteophytosis is present. No retained foreign body or acute  fracture.       Impression    IMPRESSION: Accelerated for age left glenohumeral osteoarthritis.      JAMAAL ZELAYA MD   '      ASSESSMENT/PLAN:   (M25.234) Acute pain of left shoulder  (primary encounter diagnosis)  Comment: Described symptoms and exam findings most concerning for rotator cuff injury or impingement or labral injury. X-ray shows some arthritis, otherwise negative. Will proceed with MRI for additional evaluation of soft tissue. Encouraged symptomatic management with Tylenol or ibuprofen, icing, stretching, activity as tolerated. Follow up as needed.   Plan: MR Shoulder Left w/o Contrast, CANCELED: XR         Shoulder Left 2 Views   "     Analia Cadet PA-C  [facility]    ADDENDUM:  Shoulder Mri shows arthritis, labral tear, and tendinosis. Orthopedics referral placed for additional evaluation given diffuse labral tear. Patient will schedule appointment at his convenience.     Analia Cadet PA-C on 1/20/2021 at 8:38 AM

## 2021-01-25 ENCOUNTER — OFFICE VISIT (OUTPATIENT)
Dept: ORTHOPEDICS | Facility: OTHER | Age: 31
End: 2021-01-25
Attending: PHYSICIAN ASSISTANT
Payer: COMMERCIAL

## 2021-01-25 VITALS
DIASTOLIC BLOOD PRESSURE: 74 MMHG | WEIGHT: 180 LBS | HEART RATE: 88 BPM | SYSTOLIC BLOOD PRESSURE: 120 MMHG | BODY MASS INDEX: 22.38 KG/M2 | HEIGHT: 75 IN

## 2021-01-25 DIAGNOSIS — S43.432A LABRAL TEAR OF SHOULDER, LEFT, INITIAL ENCOUNTER: ICD-10-CM

## 2021-01-25 PROCEDURE — G0463 HOSPITAL OUTPT CLINIC VISIT: HCPCS

## 2021-01-25 PROCEDURE — 99203 OFFICE O/P NEW LOW 30 MIN: CPT | Performed by: ORTHOPAEDIC SURGERY

## 2021-01-25 ASSESSMENT — MIFFLIN-ST. JEOR: SCORE: 1857.1

## 2021-01-25 NOTE — PROGRESS NOTES
Patient is here for consult on his left shoulder pain.  Tricia Balbuena LPN .....................1/25/2021 2:28 PM

## 2021-01-26 NOTE — PROGRESS NOTES
"Visit Date:   01/25/2021      CHIEF COMPLAINT:  A 30-year-old gentleman with left shoulder pain.      HISTORY OF PRESENT ILLNESS:  Lance Ariza is a 30-year-old gentleman with longstanding history of left shoulder pain.  He has a history of dislocation of his shoulder in the past multiple times and states that it will still a \"go out\" on him from time to time.  He dislocated this when he was a teenager, and then the second time that he dislocated it was while he was ashleigh jumping.  He occasionally now will have it go out on him as well when he is doing work or even at night if he puts his arm up over his head.  The pain is always there.  It is somewhat of an ache in the left shoulder, but it can be sharp if he moves in a certain position.      PHYSICAL EXAMINATION:  On exam today, he has full range of motion of the shoulder all the way up to 180 degrees.  He does have a positive apprehension sign with a positive reduction as well.  Good strength in the rotator cuff in all planes otherwise.  He is neuro and vascularly intact.      IMAGING:  X-ray examination of the left shoulder shows Samilson Haas 3 glenohumeral arthritis within the left shoulder.  The acromiohumeral distance is well maintained.  No significant changes within the greater tuberosity and undersurface of the acromion that would be consistent with rotator cuff dysfunction.  He has some mild AC joint arthritis as well.      Review of the MRI shows severe degenerative change within the glenohumeral joint, far more than would be expected for a 30-year-old gentleman.  The labrum is of course torn especially anterior-inferior as a sequela from the recent dislocations.  Otherwise, he has AC joint arthritis and some mild rotator cuff tendinitis.      IMPRESSION AND PLAN:  This is a 30-year-old gentleman with advanced glenohumeral arthritis in his left shoulder.  We are going to do nothing for him at this point other than some physical therapy.  Trying to go " down the route of giving him injections would be a bad idea, and he is better off trying the nonsteroidal anti-inflammatories at this point, as really the only surgical option for him would be a shoulder replacement.  We are going to go ahead and get him into physical therapy to try and strengthen the rotator cuff at this point, and that is just to prevent future dislocations, and that was expressed to him as well today.  He understands all of this.  I am going to see him back on an as-needed basis for his pain.  I want to see him at least at a minimum every 2 years to monitor his arthritis.  This was expressed to him as well today.         ROSA PATIÑO MD             D: 2021   T: 2021   MT: ZOLTAN      Name:     QING SOSA   MRN:      [id number]        Account:      [id number]   :      1990           Visit Date:   2021      Document: [id number]

## 2021-03-20 ENCOUNTER — OFFICE VISIT (OUTPATIENT)
Dept: FAMILY MEDICINE | Facility: OTHER | Age: 31
End: 2021-03-20
Attending: FAMILY MEDICINE
Payer: COMMERCIAL

## 2021-03-20 VITALS
DIASTOLIC BLOOD PRESSURE: 78 MMHG | SYSTOLIC BLOOD PRESSURE: 128 MMHG | WEIGHT: 181 LBS | BODY MASS INDEX: 22.62 KG/M2 | OXYGEN SATURATION: 95 % | TEMPERATURE: 98.3 F | HEART RATE: 88 BPM | RESPIRATION RATE: 16 BRPM

## 2021-03-20 DIAGNOSIS — L03.114 CELLULITIS OF LEFT UPPER EXTREMITY: Primary | ICD-10-CM

## 2021-03-20 PROCEDURE — 99213 OFFICE O/P EST LOW 20 MIN: CPT | Performed by: FAMILY MEDICINE

## 2021-03-20 PROCEDURE — G0463 HOSPITAL OUTPT CLINIC VISIT: HCPCS

## 2021-03-20 RX ORDER — CLINDAMYCIN HCL 300 MG
300 CAPSULE ORAL 4 TIMES DAILY
Qty: 40 CAPSULE | Refills: 0 | Status: SHIPPED | OUTPATIENT
Start: 2021-03-20 | End: 2021-03-30

## 2021-03-20 ASSESSMENT — PAIN SCALES - GENERAL: PAINLEVEL: EXTREME PAIN (8)

## 2021-03-20 NOTE — PROGRESS NOTES
SUBJECTIVE:   Lance Pacheco is a 31 year old male who presents to clinic today for the following health issues:    HPI  Lance is here for 1 day of left forearm redness and swelling.  Began yesterday after he was cleaning out his garage.  He has multiple scrapes and abrasions to the left hand and forearm.  Upon awakening this morning noticed increasing redness more proximally.  Some tenderness.  Has not tried any medications or topical agents to this area.  Has a history of recurrent cellulitis, presents to have it evaluated.    Patient Active Problem List    Diagnosis Date Noted     Intoxication 10/31/2010     Priority: Medium     Closed fracture of navicular (scaphoid) bone of wrist 05/20/2010     Priority: Medium     IMO Update 10/11       Sprain of acromioclavicular ligament 05/17/2007     Priority: Medium     IMO Update 10/11  IMO Update       Migraine 08/12/2003     Priority: Medium     Past Medical History:   Diagnosis Date     Depressive disorder      Night terrors      Sleep disorder      Substance abuse (H)     Treatment right before 12/25/12.      Past Surgical History:   Procedure Laterality Date     ENT SURGERY  2009    abcessed tooth     ORTHOPEDIC SURGERY  2010    Right wrist     No family history on file.  Social History     Tobacco Use     Smoking status: Current Every Day Smoker     Packs/day: 1.00     Smokeless tobacco: Current User     Types: Chew     Tobacco comment: tin lasts a week   Substance Use Topics     Alcohol use: Not Currently     Comment: Occasional  2 drinks per week     Social History     Social History Narrative    From [place], [place]     Current Outpatient Medications   Medication Sig Dispense Refill     medical cannabis (Patient's own supply) See Admin Instructions (The purpose of this order is to document that the patient reports taking medical cannabis.  This is not a prescription, and is not used to certify that the patient has a qualifying medical condition.)       No Known  Allergies    OBJECTIVE:     /78 (BP Location: Left arm, Patient Position: Sitting, Cuff Size: Adult Regular)   Pulse 88   Temp 98.3  F (36.8  C) (Tympanic)   Resp 16   Wt 82.1 kg (181 lb)   SpO2 95%   BMI 22.62 kg/m    Body mass index is 22.62 kg/m .  Physical Exam  Vitals signs and nursing note reviewed.   Constitutional:       Appearance: Normal appearance.   HENT:      Head: Normocephalic and atraumatic.      Right Ear: Tympanic membrane and ear canal normal.      Left Ear: Tympanic membrane and ear canal normal.   Cardiovascular:      Rate and Rhythm: Normal rate and regular rhythm.   Pulmonary:      Effort: Pulmonary effort is normal.   Musculoskeletal: Normal range of motion.      Comments: No significant tenosynovitis with palpation/movement.   Skin:     General: Skin is warm.      Capillary Refill: Capillary refill takes less than 2 seconds.      Findings: Erythema (6cm x 3cm ovoid raised soft tissue; mildly erythematous and ttp) present.   Neurological:      General: No focal deficit present.      Mental Status: He is alert.   Psychiatric:         Mood and Affect: Mood normal.         Behavior: Behavior normal.     Diagnostic Test Results:  No results found for any visits on 03/20/21.    ASSESSMENT/PLAN:     1. Cellulitis of left upper extremity  Vs contusion with skin color change.  Monitor area.  RICE.  Due to previous history of significant cellulitis infections in the past; have a low threshold for treatment.  Rx for clindamycin QID x 10 days.  Expect continued improvement over 3-5 days.  - clindamycin (CLEOCIN) 300 MG capsule; Take 1 capsule (300 mg) by mouth 4 times daily for 10 days  Dispense: 40 capsule; Refill: 0    Deyanira Potts DO   [facility] \[facility]\""

## 2021-03-20 NOTE — PATIENT INSTRUCTIONS
Patient Education     Discharge Instructions for Cellulitis   You have been diagnosed with cellulitis. This is an infection in the deepest layer of the skin and tissue beneath the skin. In some cases, the infection also affects the muscle. Cellulitis is caused by bacteria. The bacteria can enter the body through broken skin. This can happen with a cut, scratch, animal bite, or an insect bite that has been scratched. You may have been treated in the hospital with antibiotics and fluids. You will likely be given a prescription for antibiotics to take at home. This sheet will help you take care of yourself at home.  Home care  When you are home:    Take the prescribed antibiotic medicine you are given as directed until it is gone. Take it even if you feel better. It treats the infection and stops it from returning. Not taking all the medicine can make future infections hard to treat.    Keep the infected area clean.    When possible, raise the infected area above the level of your heart. This helps keep swelling down.    Talk with your healthcare provider if you are in pain. Ask what kind of over-the-counter medicine you can take for pain.    Apply clean bandages as advised.    Take your temperature once a day for a week.    Wash your hands often to prevent spreading the infection.  In the future, wash your hands before and after you touch cuts, scratches, or bandages. This will help prevent infection.   When to call your healthcare provider  Call your healthcare provider right away if you have any of the following:    Trouble or pain when moving the joints above or below the infected area    Discharge or pus draining from the area    Fever of 100.4 F (38 C) or higher, or as directed by your healthcare provider    Pain that gets worse in or around the infected     Redness that gets worse in or around the infected area, particularly if the area of redness expands to a wider area    Shaking chills    Swelling of the  infected area    Vomiting  Juan Pablo last reviewed this educational content on 11/1/2019 2000-2020 [employer]. All rights reserved. This information is not intended as a substitute for professional medical care. Always follow your healthcare professional's instructions.

## 2021-03-20 NOTE — NURSING NOTE
Pt states that he was cleaning out garage, does not remember getting cut or poked by anything.  Left forearm has red, warm to tough bump.  No OTC meds taken

## 2021-06-17 ENCOUNTER — OFFICE VISIT (OUTPATIENT)
Dept: FAMILY MEDICINE | Facility: OTHER | Age: 31
End: 2021-06-17
Attending: NURSE PRACTITIONER
Payer: COMMERCIAL

## 2021-06-17 ENCOUNTER — HOSPITAL ENCOUNTER (EMERGENCY)
Facility: OTHER | Age: 31
Discharge: STILL ADMITTED OR ADMINISTRATIVE | End: 2021-06-17
Attending: NURSE PRACTITIONER
Payer: COMMERCIAL

## 2021-06-17 VITALS
BODY MASS INDEX: 20.83 KG/M2 | WEIGHT: 167.5 LBS | HEIGHT: 75 IN | TEMPERATURE: 98.3 F | DIASTOLIC BLOOD PRESSURE: 68 MMHG | SYSTOLIC BLOOD PRESSURE: 126 MMHG | RESPIRATION RATE: 16 BRPM | OXYGEN SATURATION: 96 % | HEART RATE: 62 BPM

## 2021-06-17 VITALS
RESPIRATION RATE: 18 BRPM | HEART RATE: 76 BPM | BODY MASS INDEX: 22.62 KG/M2 | OXYGEN SATURATION: 98 % | TEMPERATURE: 98.3 F | WEIGHT: 181 LBS | DIASTOLIC BLOOD PRESSURE: 67 MMHG | SYSTOLIC BLOOD PRESSURE: 118 MMHG

## 2021-06-17 DIAGNOSIS — S99.912A ANKLE INJURY, LEFT, INITIAL ENCOUNTER: Primary | ICD-10-CM

## 2021-06-17 PROCEDURE — 99213 OFFICE O/P EST LOW 20 MIN: CPT | Performed by: NURSE PRACTITIONER

## 2021-06-17 PROCEDURE — G0463 HOSPITAL OUTPT CLINIC VISIT: HCPCS

## 2021-06-17 ASSESSMENT — MIFFLIN-ST. JEOR: SCORE: 1795.41

## 2021-06-17 ASSESSMENT — PAIN SCALES - GENERAL: PAINLEVEL: SEVERE PAIN (6)

## 2021-06-17 NOTE — ED TRIAGE NOTES
"ED Nursing Triage Note (General)   ________________________________    Lance Pacheco is a 31 year old Male that presents to triage via private vehicle with complaints of L) ankle pain.  Patient states he rolled his ankle at work and states, \"I know its not broken but im worried about a tendon or a ligament.  I can feel it up to my butt in the back and its almost like I pulle a muscle but I didn't\".   Significant symptoms had onset 6 hours ago.  GCS-15  Breathing noted as Normal  Action taken: level 4      PRE HOSPITAL PRIOR LIVING SITUATION-home  "

## 2021-06-17 NOTE — PROGRESS NOTES
"ASSESSMENT/PLAN:  1. Ankle injury, left, initial encounter    Patient states that he \"does not think that it is my bone\". Patient was asking when he would be able to get an MRI. Discussed with the patient that an x-ray would be the first step to take in order to rule out any possible acute fractures. The patient initially was understanding and wanted to proceed with starting with an x-ray of the left ankle.  However, after discussing that an MRI is usually not the next step and that conservative therapy with RICE and then possibly PT is typically the next step if there are no fractures seen on xray. The patient became very angry while I was ordering the xray and stated that his boss will not let him take a whole week off of work due to this injury. I offered the patient that he have this visit then as a workman's compensation injury and then the patient became very angry. While I was starting to order the xray the patient walked out of the exam room stating \"I thought that I could get this xray so that I can get an MRI\" I told the patient that I was placing an order for the xray and that was the plan is to complete an xray of the left ankle today. However, the patient left the exam room and then left the clinic.       Discussed warning signs/symptoms indicative of need to f/u    Follow up if symptoms persist or worsen or concerns      I explained my diagnostic considerations and recommendations to the patient, who voiced understanding and agreement with the treatment plan. All questions were answered. We discussed potential side effects of any prescribed or recommended therapies, as well as expectations for response to treatments.        HPI:    Lance Pacheco is a 31 year old male  who presents to [facility] today for left ankle injury. The patient states that at 11am today he rolled his left ankle down a hill while he was at work. The patient reports that he would not like to have this as a Workmen's " "compensation injury. The patient rolled the left ankle medially and onto his left lateral malleolus. The patient reports applying ice to the left ankle today. The patient states that he has the most pain with dorsiflexion.  He is rating his pain 6/10 at rest and 8/10 when ambulating. Describes the pain as burning and shooting. Has most pain to the lateral surface of the left ankle. Denies pain to his left foot or toes.     Past Medical History:   Diagnosis Date     Depressive disorder      Night terrors      Sleep disorder      Substance abuse (H)     Treatment right before 12/25/12.     Past Surgical History:   Procedure Laterality Date     ENT SURGERY  2009    abcessed tooth     ORTHOPEDIC SURGERY  2010    Right wrist     Social History     Tobacco Use     Smoking status: Current Every Day Smoker     Packs/day: 1.00     Smokeless tobacco: Current User     Types: Chew     Tobacco comment: tin lasts a week   Substance Use Topics     Alcohol use: Not Currently     Comment: Occasional  2 drinks per week     Current Outpatient Medications   Medication Sig Dispense Refill     medical cannabis (Patient's own supply) See Admin Instructions (The purpose of this order is to document that the patient reports taking medical cannabis.  This is not a prescription, and is not used to certify that the patient has a qualifying medical condition.)       No Known Allergies      Past medical history, past surgical history, current medications and allergies reviewed and accurate to the best of my knowledge.        ROS:  Refer to HPI    /68   Pulse 62   Temp 98.3  F (36.8  C) (Tympanic)   Resp 16   Ht 1.905 m (6' 3\")   Wt 76 kg (167 lb 8 oz)   SpO2 96%   BMI 20.94 kg/m      EXAM:  General Appearance: Well appearing male, appropriate appearance for age. No acute distress  Musculoskeletal:  Equal movement of bilateral upper extremities.  Patient has limited dorsiflexion and plantarflexion of the left ankle.  Normal gait.  "   Dermatological: no rashes noted of exposed skin.  No erythema or ecchymosis noted to the left ankle on exam.  However there is swelling over the left lateral malleolus.  Psychological: normal affect, alert, oriented, and pleasant.

## 2021-06-17 NOTE — NURSING NOTE
"Chief Complaint   Patient presents with     Musculoskeletal Problem     left ankle     Patient is here for pain and swelling in the left ankle that started around 11am. Patient states he rolled his ankle. Patient states he did try ice when he got home with some relief of pain.     Initial /68   Pulse 62   Temp 98.3  F (36.8  C) (Tympanic)   Resp 16   Ht 1.905 m (6' 3\")   Wt 76 kg (167 lb 8 oz)   SpO2 96%   BMI 20.94 kg/m   Estimated body mass index is 20.94 kg/m  as calculated from the following:    Height as of this encounter: 1.905 m (6' 3\").    Weight as of this encounter: 76 kg (167 lb 8 oz).  Medication Reconciliation: complete    Jaycee Rawls LPN  "

## 2022-04-18 ENCOUNTER — OFFICE VISIT (OUTPATIENT)
Dept: ORTHOPEDICS | Facility: OTHER | Age: 32
End: 2022-04-18
Attending: ORTHOPAEDIC SURGERY
Payer: COMMERCIAL

## 2022-04-18 DIAGNOSIS — G89.29 CHRONIC LEFT SHOULDER PAIN: Primary | ICD-10-CM

## 2022-04-18 DIAGNOSIS — M25.512 CHRONIC LEFT SHOULDER PAIN: Primary | ICD-10-CM

## 2022-04-18 PROCEDURE — 20610 DRAIN/INJ JOINT/BURSA W/O US: CPT | Mod: LT | Performed by: ORTHOPAEDIC SURGERY

## 2022-04-18 PROCEDURE — 250N000011 HC RX IP 250 OP 636: Performed by: ORTHOPAEDIC SURGERY

## 2022-04-18 PROCEDURE — 99213 OFFICE O/P EST LOW 20 MIN: CPT | Mod: 25 | Performed by: ORTHOPAEDIC SURGERY

## 2022-04-18 PROCEDURE — 250N000009 HC RX 250: Performed by: ORTHOPAEDIC SURGERY

## 2022-04-18 RX ORDER — TRIAMCINOLONE ACETONIDE 40 MG/ML
40 INJECTION, SUSPENSION INTRA-ARTICULAR; INTRAMUSCULAR ONCE
Status: COMPLETED | OUTPATIENT
Start: 2022-04-18 | End: 2022-04-18

## 2022-04-18 RX ORDER — LIDOCAINE HYDROCHLORIDE 10 MG/ML
4 INJECTION, SOLUTION EPIDURAL; INFILTRATION; INTRACAUDAL; PERINEURAL ONCE
Status: COMPLETED | OUTPATIENT
Start: 2022-04-18 | End: 2022-04-18

## 2022-04-18 RX ADMIN — TRIAMCINOLONE ACETONIDE 40 MG: 40 INJECTION, SUSPENSION INTRA-ARTICULAR; INTRAMUSCULAR at 13:54

## 2022-04-18 RX ADMIN — LIDOCAINE HYDROCHLORIDE 4 ML: 10 INJECTION, SOLUTION EPIDURAL; INFILTRATION; INTRACAUDAL; PERINEURAL at 13:53

## 2022-04-18 NOTE — PROGRESS NOTES
Chief Complaint   Patient presents with     RECHECK     Follow up left shoulder pain       Nasra Cornelius LPN

## 2022-04-18 NOTE — PROGRESS NOTES
A steroid injection was performed at L glenohumeral joint using 1% plain Lidocaine and 40 mg of Kenalog. This was well tolerated.

## 2022-06-13 ENCOUNTER — HOSPITAL ENCOUNTER (OUTPATIENT)
Dept: GENERAL RADIOLOGY | Facility: OTHER | Age: 32
Discharge: HOME | End: 2022-06-13
Attending: PHYSICIAN ASSISTANT
Payer: COMMERCIAL

## 2022-06-13 ENCOUNTER — OFFICE VISIT (OUTPATIENT)
Dept: FAMILY MEDICINE | Facility: OTHER | Age: 32
End: 2022-06-13
Attending: PHYSICIAN ASSISTANT
Payer: COMMERCIAL

## 2022-06-13 VITALS
SYSTOLIC BLOOD PRESSURE: 122 MMHG | TEMPERATURE: 97.1 F | HEIGHT: 75 IN | RESPIRATION RATE: 14 BRPM | WEIGHT: 175.2 LBS | OXYGEN SATURATION: 96 % | BODY MASS INDEX: 21.78 KG/M2 | HEART RATE: 71 BPM | DIASTOLIC BLOOD PRESSURE: 70 MMHG

## 2022-06-13 DIAGNOSIS — M54.41 ACUTE RIGHT-SIDED LOW BACK PAIN WITH RIGHT-SIDED SCIATICA: Primary | ICD-10-CM

## 2022-06-13 DIAGNOSIS — M25.551 HIP PAIN, RIGHT: ICD-10-CM

## 2022-06-13 DIAGNOSIS — M54.41 ACUTE RIGHT-SIDED LOW BACK PAIN WITH RIGHT-SIDED SCIATICA: ICD-10-CM

## 2022-06-13 PROBLEM — M19.012 GLENOHUMERAL ARTHRITIS, LEFT: Status: ACTIVE | Noted: 2021-03-23

## 2022-06-13 PROBLEM — S43.432S LABRAL TEAR OF SHOULDER, LEFT, SEQUELA: Status: ACTIVE | Noted: 2021-03-23

## 2022-06-13 PROBLEM — M25.512 CHRONIC LEFT SHOULDER PAIN: Status: ACTIVE | Noted: 2021-03-23

## 2022-06-13 PROBLEM — G89.29 CHRONIC LEFT SHOULDER PAIN: Status: ACTIVE | Noted: 2021-03-23

## 2022-06-13 PROCEDURE — 73502 X-RAY EXAM HIP UNI 2-3 VIEWS: CPT

## 2022-06-13 PROCEDURE — 72100 X-RAY EXAM L-S SPINE 2/3 VWS: CPT

## 2022-06-13 PROCEDURE — G0463 HOSPITAL OUTPT CLINIC VISIT: HCPCS | Mod: 25

## 2022-06-13 PROCEDURE — G0463 HOSPITAL OUTPT CLINIC VISIT: HCPCS

## 2022-06-13 PROCEDURE — 99214 OFFICE O/P EST MOD 30 MIN: CPT | Performed by: PHYSICIAN ASSISTANT

## 2022-06-13 RX ORDER — TIZANIDINE 2 MG/1
2 TABLET ORAL 3 TIMES DAILY PRN
Qty: 30 TABLET | Refills: 0 | Status: SHIPPED | OUTPATIENT
Start: 2022-06-13 | End: 2022-08-29

## 2022-06-13 ASSESSMENT — PAIN SCALES - GENERAL: PAINLEVEL: MODERATE PAIN (5)

## 2022-06-13 ASSESSMENT — PATIENT HEALTH QUESTIONNAIRE - PHQ9
10. IF YOU CHECKED OFF ANY PROBLEMS, HOW DIFFICULT HAVE THESE PROBLEMS MADE IT FOR YOU TO DO YOUR WORK, TAKE CARE OF THINGS AT HOME, OR GET ALONG WITH OTHER PEOPLE: NOT DIFFICULT AT ALL
SUM OF ALL RESPONSES TO PHQ QUESTIONS 1-9: 1
SUM OF ALL RESPONSES TO PHQ QUESTIONS 1-9: 1

## 2022-06-13 NOTE — NURSING NOTE
Patient presents to clinic with right hip pain that goes down right leg x 8 days. No injury or trauma  Samantha Buitrago LPN ....................  6/13/2022   8:08 AM

## 2022-06-13 NOTE — PROGRESS NOTES
Assessment & Plan     1. Acute right-sided low back pain with right-sided sciatica  Lumbar x-ray unremarkable.  Likely soft tissue injury.  Prescription for muscle relaxer as below.  Encourage symptomatic management with Tylenol or ibuprofen, icing, stretching, activity as tolerated.  If no improvement consider chiropractic care or physical therapy.  - XR Lumbar Spine 2/3 Views; Future  - tiZANidine (ZANAFLEX) 2 MG tablet; Take 1 tablet (2 mg) by mouth 3 times daily as needed for muscle spasms  Dispense: 30 tablet; Refill: 0    2. Hip pain, right  Hip x-ray unremarkable.  Likely soft tissue related, related to above.  Encourage symptomatic management with Tylenol or ibuprofen, icing, stretching and activity as tolerated.  If no improvement consider chiropractic or physical therapy.  - XR Hip Right 2-3 Views; Future      Return if symptoms worsen or fail to improve.    Analia Cadet PA-C  [facility]    Jd Lucas is a 32 year old who presents for the following health issues    History of Present Illness       Reason for visit:  Pinched nerve in hip    He eats 2-3 servings of fruits and vegetables daily.He consumes 4 sweetened beverage(s) daily.He exercises with enough effort to increase his heart rate 60 or more minutes per day.  He exercises with enough effort to increase his heart rate 5 days per week.   He is taking medications regularly.    Today's PHQ-9         PHQ-9 Total Score: 1    PHQ-9 Q9 Thoughts of better off dead/self-harm past 2 weeks :   Not at all    How difficult have these problems made it for you to do your work, take care of things at home, or get along with other people: Not difficult at all       Acute Illness  Acute illness concerns: right hip pain  Onset/Duration: 8 days  Symptoms:  Fever: no  Chills/Sweats: no  Headache (location?): no  Sinus Pressure: no  Conjunctivitis:  no  Ear Pain: no  Rhinorrhea: no  Congestion: no  Sore Throat: no  Cough:  "no  Wheeze: no  Decreased Appetite: no  Nausea: no  Vomiting: no  Diarrhea: no  Dysuria/Freq.: no  Dysuria or Hematuria: no  Fatigue/Achiness: no  Sick/Strep Exposure: no  Therapies tried and outcome: None    Here for evaluation of right hip and low back pain that began 8 days ago.  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  Patient reports he has right-sided middle and right low back pain, pain in his right hip that radiates down his right leg.  Feels like his hip is \"dislocating' when he is walking.  Difficulties with bearing weight and walking.  His not taking anything for symptomatic management at home.  No associated saddle anesthesia, loss of bowel or bladder control, weakness.       PAST MEDICAL HISTORY:   Past Medical History:   Diagnosis Date     Depressive disorder      Night terrors      Sleep disorder      Substance abuse (H)     Treatment right before 12/25/12.       PAST SURGICAL HISTORY:   Past Surgical History:   Procedure Laterality Date     ENT SURGERY  2009    abcessed tooth     ORTHOPEDIC SURGERY  2010    Right wrist       FAMILY HISTORY: History reviewed. No pertinent family history.    SOCIAL HISTORY:   Social History     Tobacco Use     Smoking status: Current Every Day Smoker     Packs/day: 1.00     Smokeless tobacco: Former User     Types: Chew     Tobacco comment: tin lasts a week   Substance Use Topics     Alcohol use: Not Currently     Comment: Occasional  2 drinks per week      No Known Allergies  Current Outpatient Medications   Medication     medical cannabis (Patient's own supply)     tiZANidine (ZANAFLEX) 2 MG tablet     No current facility-administered medications for this visit.         Review of Systems   Per HPI        Objective    /70   Pulse 71   Temp 97.1  F (36.2  C)   Resp 14   Ht 1.905 m (6' 3\")   Wt 79.5 kg (175 lb 3.2 oz)   SpO2 96%   BMI 21.90 kg/m    Body mass index is 21.9 " kg/m .  Physical Exam   General: Pleasant, in no apparent distress.  Musculoskeletal: Back is straight, tenderness to palpation of paraspinal and paravertebral muscles in right lumber region.  Tenderness palpation over lateral right hip.  Decreased internal and external rotation of right hip compared to left due to pain.  Limited flexion of right hip compared to left.  Antalgic gait in clinic.  Neurologic Exam: normal gross motor, tone coordination and no visible tremor.  Psych: Appropriate mood and affect.    Results for orders placed or performed during the hospital encounter of 06/13/22   XR Hip Right 2-3 Views     Status: None    Narrative    PROCEDURE:  XR HIP RIGHT 2-3 VIEWS    HISTORY: Hip pain, right    COMPARISON:  None.    TECHNIQUE:  2 views of the right hip were obtained. In view of the  pelvis was obtained    FINDINGS:  Pelvis: The pelvis is intact. The sacrum sacroiliac joints  appear normal. Articular spaces are normal in height at the left hip.  The left proximal femur appears normal.    Right hip 2 views: Articular spaces normal height. The acetabulum and  right proximal femur appears normal.       Impression    IMPRESSION: Normal right hip and pelvis      WILLI ESPINOSA MD         SYSTEM ID:  [id number]   Results for orders placed or performed during the hospital encounter of 06/13/22   XR Lumbar Spine 2/3 Views     Status: None    Narrative    PROCEDURE: XR LUMBAR SPINE 2-3 VIEWS 6/13/2022 8:37 AM    HISTORY: Acute right-sided low back pain with right-sided sciatica    COMPARISONS: None.    TECHNIQUE: AP and lateral    FINDINGS: The lumbar disks are normal in height. Lumbar facet joints  are normal. The lumbar vertebral bodies and arches are intact. The  paravertebral soft tissues are normal.         Impression    IMPRESSION: Normal lumbar spine    WILLI ESPINOSA MD         SYSTEM ID:  [id number]

## 2022-07-05 ENCOUNTER — OFFICE VISIT (OUTPATIENT)
Dept: FAMILY MEDICINE | Facility: OTHER | Age: 32
End: 2022-07-05
Attending: PHYSICIAN ASSISTANT
Payer: COMMERCIAL

## 2022-07-05 VITALS
SYSTOLIC BLOOD PRESSURE: 128 MMHG | TEMPERATURE: 98.3 F | BODY MASS INDEX: 22.42 KG/M2 | DIASTOLIC BLOOD PRESSURE: 78 MMHG | RESPIRATION RATE: 18 BRPM | HEART RATE: 67 BPM | WEIGHT: 179.4 LBS | OXYGEN SATURATION: 95 %

## 2022-07-05 DIAGNOSIS — M54.41 ACUTE RIGHT-SIDED LOW BACK PAIN WITH RIGHT-SIDED SCIATICA: Primary | ICD-10-CM

## 2022-07-05 PROCEDURE — 99213 OFFICE O/P EST LOW 20 MIN: CPT | Performed by: FAMILY MEDICINE

## 2022-07-05 PROCEDURE — G0463 HOSPITAL OUTPT CLINIC VISIT: HCPCS

## 2022-07-05 RX ORDER — HYDROCODONE BITARTRATE AND ACETAMINOPHEN 5; 325 MG/1; MG/1
1 TABLET ORAL EVERY 6 HOURS PRN
Qty: 15 TABLET | Refills: 0 | Status: SHIPPED | OUTPATIENT
Start: 2022-07-05 | End: 2022-08-29

## 2022-07-05 RX ORDER — METHYLPREDNISOLONE 4 MG
TABLET, DOSE PACK ORAL
Qty: 21 TABLET | Refills: 0 | Status: SHIPPED | OUTPATIENT
Start: 2022-07-05 | End: 2022-08-29

## 2022-07-05 ASSESSMENT — PAIN SCALES - GENERAL: PAINLEVEL: WORST PAIN (10)

## 2022-07-05 NOTE — PROGRESS NOTES
(M54.41) Acute right-sided low back pain with right-sided sciatica  (primary encounter diagnosis)  Comment:   Symptoms of lumbar radiculopathy.  Persistent over a month.  Plan: MR Lumbar Spine w/o Contrast,         methylPREDNISolone (MEDROL DOSEPAK) 4 MG tablet        therapy pack, HYDROcodone-acetaminophen (NORCO)        5-325 MG tablet        Advised him to make follow-up following MRI study.        HISTORY    He has had symptoms for about a month.  Had negative x-rays of lumbar spine and right hip 6-.  Tried muscle relaxer.    Patient is describing significant pain in right low back radiating over right buttock and down his leg to about mid shin level.    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    No previous back surgery.      REVIEW OF SYSTEMS    No fever.  No abdominal pain.  No bowel or bladder dysfunction.  No rash.      EXAM  /78 (BP Location: Left arm, Patient Position: Sitting, Cuff Size: Adult Regular)   Pulse 67   Temp 98.3  F (36.8  C) (Tympanic)   Resp 18   Wt 81.4 kg (179 lb 6.4 oz)   SpO2 95%   BMI 22.42 kg/m      Antalgic gait.  Right paralumbar tightness noted.  SLRs produce some right-sided radicular pain.  He can heel and toe walk normally.

## 2022-07-17 ENCOUNTER — HOSPITAL ENCOUNTER (OUTPATIENT)
Dept: MRI IMAGING | Facility: OTHER | Age: 32
Discharge: HOME | End: 2022-07-17
Attending: FAMILY MEDICINE | Admitting: FAMILY MEDICINE
Payer: COMMERCIAL

## 2022-07-17 DIAGNOSIS — M54.41 ACUTE RIGHT-SIDED LOW BACK PAIN WITH RIGHT-SIDED SCIATICA: ICD-10-CM

## 2022-07-17 PROCEDURE — 72148 MRI LUMBAR SPINE W/O DYE: CPT

## 2022-07-18 ENCOUNTER — TELEPHONE (OUTPATIENT)
Dept: FAMILY MEDICINE | Facility: OTHER | Age: 32
End: 2022-07-18

## 2022-07-18 DIAGNOSIS — M54.41 ACUTE RIGHT-SIDED LOW BACK PAIN WITH RIGHT-SIDED SCIATICA: Primary | ICD-10-CM

## 2022-07-18 NOTE — TELEPHONE ENCOUNTER
----- Message from Jaycee Rawls LPN sent at 7/18/2022  1:08 PM CDT -----  After patient's name and date of birth were verified, the below information was given to patient who verbalized understanding and had no further questions at this time.     Patient states he would like the ortho referral.     Jaycee Rawls LPN 7/18/2022 1:08 PM

## 2022-07-18 NOTE — TELEPHONE ENCOUNTER
Referral placed to Dr. Koroma. OA will contact patient to schedule.     Tami Montenegro PA-C  7/18/2022  1:36 PM

## 2022-08-27 ENCOUNTER — HOSPITAL ENCOUNTER (EMERGENCY)
Facility: OTHER | Age: 32
Discharge: HOME | End: 2022-08-27
Attending: EMERGENCY MEDICINE | Admitting: EMERGENCY MEDICINE
Payer: COMMERCIAL

## 2022-08-27 VITALS
SYSTOLIC BLOOD PRESSURE: 118 MMHG | HEIGHT: 75 IN | HEART RATE: 51 BPM | DIASTOLIC BLOOD PRESSURE: 67 MMHG | OXYGEN SATURATION: 99 % | TEMPERATURE: 96.7 F | BODY MASS INDEX: 22.38 KG/M2 | RESPIRATION RATE: 18 BRPM | WEIGHT: 180 LBS

## 2022-08-27 DIAGNOSIS — K04.7 PERIAPICAL ABSCESS: ICD-10-CM

## 2022-08-27 PROCEDURE — 64400 NJX AA&/STRD TRIGEMINAL NRV: CPT | Performed by: EMERGENCY MEDICINE

## 2022-08-27 PROCEDURE — 250N000009 HC RX 250: Performed by: EMERGENCY MEDICINE

## 2022-08-27 PROCEDURE — 99283 EMERGENCY DEPT VISIT LOW MDM: CPT | Mod: 25 | Performed by: EMERGENCY MEDICINE

## 2022-08-27 RX ORDER — BUPIVACAINE HYDROCHLORIDE AND EPINEPHRINE 2.5; 5 MG/ML; UG/ML
2 INJECTION, SOLUTION INFILTRATION; PERINEURAL ONCE
Status: COMPLETED | OUTPATIENT
Start: 2022-08-27 | End: 2022-08-27

## 2022-08-27 RX ORDER — PENICILLIN V POTASSIUM 500 MG/1
500 TABLET, FILM COATED ORAL 4 TIMES DAILY
Qty: 40 TABLET | Refills: 0 | Status: SHIPPED | OUTPATIENT
Start: 2022-08-27 | End: 2022-09-06

## 2022-08-27 RX ORDER — NAPROXEN 500 MG/1
500 TABLET ORAL 2 TIMES DAILY WITH MEALS
Qty: 20 TABLET | Refills: 0 | Status: SHIPPED | OUTPATIENT
Start: 2022-08-27 | End: 2023-04-12

## 2022-08-27 RX ADMIN — BUPIVACAINE HYDROCHLORIDE AND EPINEPHRINE BITARTRATE 2 ML: 2.5; .005 INJECTION, SOLUTION INFILTRATION; PERINEURAL at 09:14

## 2022-08-27 ASSESSMENT — ACTIVITIES OF DAILY LIVING (ADL): ADLS_ACUITY_SCORE: 35

## 2022-08-27 NOTE — ED PROVIDER NOTES
[facility]  Emergency Department Visit Note    Dental Pain      History of Present Illness     HPI:  Lance Pacheco is a 32 year old male presenting with dental pain. The pain is located in the left lower jaw. These symptoms started 3 weeks ago. This tooth has been painful in the past and it is broken.  The pain is aggravated by chewing or touching the affected tooth. The patient has not seen a dentist regarding this pain. The patient has no fever, chills, oral discharge, sore throat, difficulty swallowing, difficulty breathing, shortness of breath.    Medications:  Prior to Admission medications    Medication Sig Last Dose Taking? Auth Provider Long Term End Date   HYDROcodone-acetaminophen (NORCO) 5-325 MG tablet Take 1 tablet by mouth every 6 hours as needed for pain   Tejas Loving MD     medical cannabis (Patient's own supply) See Admin Instructions (The purpose of this order is to document that the patient reports taking medical cannabis.  This is not a prescription, and is not used to certify that the patient has a qualifying medical condition.)   Reported, Patient     methylPREDNISolone (MEDROL DOSEPAK) 4 MG tablet therapy pack Follow Package Directions   Tejas Loving MD     tiZANidine (ZANAFLEX) 2 MG tablet Take 1 tablet (2 mg) by mouth 3 times daily as needed for muscle spasms   Analia Cadet, PAKaydenC         Allergies:  No Known Allergies    Problem List:  Patient Active Problem List   Diagnosis     Closed fracture of navicular (scaphoid) bone of wrist     Intoxication     Migraine     Sprain of acromioclavicular ligament     Chronic left shoulder pain     Glenohumeral arthritis, left     Labral tear of shoulder, left, sequela       Past Medical History:  Past Medical History:   Diagnosis Date     Depressive disorder      Night terrors      Sleep disorder      Substance abuse (H)     Treatment right before 12/25/12.       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       Review of Systems:  See HPI    Physical Exam     Vital signs: /67   Pulse 51   Temp (!) 96.7  F (35.9  C) (Tympanic)   Resp 18   Ht 1.905 m (6' 3\")   Wt 81.6 kg (180 lb)   SpO2 99%   BMI 22.50 kg/m      Physical Exam:  General: awake and alert, uncomfortable  HEENT: no scleral injection, no nasal discharge, neck supple  Mouth: tooth # 20 is tender to percussion and fractured, no palpable fluctuance, poor dentition throughout, other teeth are nontender, oropharynx clear without erythema, swelling or masses  No submental or cervical LAD  Skin: warm, dry, no rashes  Neuro: alert, moving extremities x 4, ambulates without difficulty      Medical Decision Making & ED Course     Lance Pacheco is a 32 ear old male presenting with dental pain. Differential includes dental caries, apical abscess, dental abscess, tooth fracture, peritonsillar abscess, gingivitis, pericoronitis, maxillary sinusitis. History and exam is most suggestive of dental caries or apical abscess. There is no fluctuance suggestive of a dental buccal or lingual alveolar abscess. Exam is inconsistent with acute tooth fracture, gingival pathology, or posterior oropharynx soft tissue abscess. The patient did accept a dental block for pain control. Given the possibility of apical abscess, a trial of penicillin is warranted and the patient is stable for discharge with " Naprosyn for pain control and close follow up with a dentist.  Patient given instructions on follow-up and warning signs for which to return to ED. All questions were answered and the patient is comfortable with plan for discharge. The patient was discharged in stable condition.  .    Diagnosis and Disposition     Diagnosis:  1. Periapical abscess        Disposition:  Home    Gela Nugent MD        Dental Block Procedure Note   Indication: Focal dental pain  Anesthetic: Marcaine with epinephrine  Volume: 2 mls  Location: left inferior alveolar nerve  Patient tolerated well without complications       Gela Nugent MD  08/27/22 [id number]

## 2022-08-27 NOTE — ED TRIAGE NOTES
Pt complains of LL tooth pain that began yesterday, states has had tooth abscess before and this feels the same way. 800mg ibuprofen taken PTA.      Triage Assessment     Row Name 08/27/22 0822       Triage Assessment (Adult)    Airway WDL WDL       Respiratory WDL    Respiratory WDL WDL       Skin Circulation/Temperature WDL    Skin Circulation/Temperature WDL WDL       Cardiac WDL    Cardiac WDL WDL       Peripheral/Neurovascular WDL    Peripheral Neurovascular WDL WDL       Cognitive/Neuro/Behavioral WDL    Cognitive/Neuro/Behavioral WDL WDL

## 2022-08-28 ENCOUNTER — HOSPITAL ENCOUNTER (EMERGENCY)
Facility: OTHER | Age: 32
Discharge: HOME | End: 2022-08-28
Payer: COMMERCIAL

## 2022-08-28 ENCOUNTER — HOSPITAL ENCOUNTER (EMERGENCY)
Facility: OTHER | Age: 32
Discharge: HOME | End: 2022-08-28
Attending: FAMILY MEDICINE
Payer: COMMERCIAL

## 2022-08-28 VITALS
OXYGEN SATURATION: 99 % | HEART RATE: 75 BPM | RESPIRATION RATE: 18 BRPM | BODY MASS INDEX: 22.5 KG/M2 | TEMPERATURE: 97.6 F | DIASTOLIC BLOOD PRESSURE: 67 MMHG | SYSTOLIC BLOOD PRESSURE: 110 MMHG | WEIGHT: 180 LBS

## 2022-08-28 VITALS
WEIGHT: 180 LBS | TEMPERATURE: 98.2 F | HEART RATE: 78 BPM | SYSTOLIC BLOOD PRESSURE: 126 MMHG | OXYGEN SATURATION: 97 % | BODY MASS INDEX: 22.5 KG/M2 | DIASTOLIC BLOOD PRESSURE: 76 MMHG | RESPIRATION RATE: 18 BRPM

## 2022-08-28 NOTE — ED TRIAGE NOTES
ED Nursing Triage Note (General)   ________________________________    Lance Pacheco is a 32 year old Male that presents to triage via private vehicle with complaints of dental pain.  Patient states he was seen yesterday and was started on penicillin.  Patient states he has been on antibiotics for 24hours, however, does not feel like he is getting any better and states pain has continued. Swelling noted to L) lower jaw on arrival.   Significant symptoms had onset 24 hours ago.  Patient appears alert behavior.  GCS-15  Airway: intact  Breathing noted as Normal  Action taken: 4      PRE HOSPITAL PRIOR LIVING SITUATION-home

## 2022-08-29 ENCOUNTER — HOSPITAL ENCOUNTER (EMERGENCY)
Facility: OTHER | Age: 32
Discharge: HOME | End: 2022-08-29
Payer: COMMERCIAL

## 2022-08-29 VITALS
OXYGEN SATURATION: 97 % | HEART RATE: 61 BPM | DIASTOLIC BLOOD PRESSURE: 69 MMHG | TEMPERATURE: 98.5 F | HEIGHT: 75 IN | BODY MASS INDEX: 22.38 KG/M2 | RESPIRATION RATE: 16 BRPM | WEIGHT: 180 LBS | SYSTOLIC BLOOD PRESSURE: 118 MMHG

## 2022-08-29 ASSESSMENT — ACTIVITIES OF DAILY LIVING (ADL): ADLS_ACUITY_SCORE: 33

## 2022-08-29 NOTE — ED TRIAGE NOTES
"ED Nursing Triage Note (General)   ________________________________    Lance Pacheco is a 32 year old Male that presents to triage via private vehicle with complaints of L) lower jaw tooth pain.  Patient was seen in the ED last night and prescribed antibiotics.  Patient states it has been 24hrs and his tooth is not feeling any better.  Patient came to the ER earlier this evening and left without being seen because he states, \"the wait was to long\".  Patient has returned to the ER due to continued pain and swelling in his jaw.   Significant symptoms had onset 1 day ago.  Patient appears alert behavior.  GCS-15  Airway: intact  Breathing noted as Normal  Action taken:  4      PRE HOSPITAL PRIOR LIVING SITUATION-home      "

## 2022-08-29 NOTE — ED TRIAGE NOTES
"Pt states he was to the ED \"the other day\" for tooth pain and was given an antibiotic scrip.  Pt states he has been taking the antibiotic for 3 days and has not helped.  Pt states now that he is still having the pain and his lower left jaw seems swollen and is afraid there is an abscess there.       Triage Assessment     Row Name 08/29/22 0948       Triage Assessment (Adult)    Airway WDL WDL       Respiratory WDL    Respiratory WDL WDL       Skin Circulation/Temperature WDL    Skin Circulation/Temperature WDL WDL       Cardiac WDL    Cardiac WDL WDL       Peripheral/Neurovascular WDL    Peripheral Neurovascular WDL WDL              "

## 2022-09-04 ENCOUNTER — OFFICE VISIT (OUTPATIENT)
Dept: FAMILY MEDICINE | Facility: OTHER | Age: 32
End: 2022-09-04
Attending: FAMILY MEDICINE
Payer: COMMERCIAL

## 2022-09-04 ENCOUNTER — HOSPITAL ENCOUNTER (OUTPATIENT)
Dept: GENERAL RADIOLOGY | Facility: OTHER | Age: 32
Discharge: HOME | End: 2022-09-04
Attending: FAMILY MEDICINE
Payer: COMMERCIAL

## 2022-09-04 VITALS
TEMPERATURE: 98.7 F | OXYGEN SATURATION: 98 % | DIASTOLIC BLOOD PRESSURE: 62 MMHG | WEIGHT: 172.6 LBS | BODY MASS INDEX: 22.88 KG/M2 | HEART RATE: 75 BPM | RESPIRATION RATE: 24 BRPM | SYSTOLIC BLOOD PRESSURE: 118 MMHG | HEIGHT: 73 IN

## 2022-09-04 DIAGNOSIS — R07.81 RIB PAIN ON LEFT SIDE: ICD-10-CM

## 2022-09-04 DIAGNOSIS — R07.81 RIB PAIN ON LEFT SIDE: Primary | ICD-10-CM

## 2022-09-04 PROCEDURE — G0463 HOSPITAL OUTPT CLINIC VISIT: HCPCS

## 2022-09-04 PROCEDURE — G0463 HOSPITAL OUTPT CLINIC VISIT: HCPCS | Mod: 25

## 2022-09-04 PROCEDURE — 99213 OFFICE O/P EST LOW 20 MIN: CPT | Performed by: FAMILY MEDICINE

## 2022-09-04 PROCEDURE — 71101 X-RAY EXAM UNILAT RIBS/CHEST: CPT | Mod: LT

## 2022-09-04 ASSESSMENT — PAIN SCALES - GENERAL: PAINLEVEL: EXTREME PAIN (8)

## 2022-09-04 NOTE — NURSING NOTE
Pt here for left sided rib pain.  Pt was lifting a big jug of water last night and felt something pop.  Hard to take a deep breathe and move.  Sheila Hastings CMA ([facility])......................9/4/2022  3:15 PM       Medication Reconciliation: complete    Sheila Hastings CMA  9/4/2022 3:15 PM

## 2022-09-04 NOTE — PROGRESS NOTES
"  Assessment & Plan       ICD-10-CM    1. Rib pain on left side  R07.81 XR Ribs & Chest Lt 3v     Reviewed XR with the patient, no acute findings. Discussed symptomatic management, expectations. Will contact patient if radiology interpretation differs.        Nicotine/Tobacco Cessation:  He reports that he has been smoking. He has been smoking about 1.00 pack per day. He has quit using smokeless tobacco.  His smokeless tobacco use included chew.  Nicotine/Tobacco Cessation Plan:       No follow-ups on file.    Elayne Augustine MD  [facility]   Lance is a 32 year old, presenting for the following health issues:  Rib Pain (Left side )      HPI     Lifting a large water jug last night and heard a loud pop in the area of his L lower rib. persistent pain and now hurts with deep breathing and coughing.     Pain when riding a  today.           Objective    /62 (BP Location: Right arm, Patient Position: Sitting, Cuff Size: Adult Large)   Pulse 75   Temp 98.7  F (37.1  C) (Tympanic)   Resp 24   Ht 1.854 m (6' 1\")   Wt 78.3 kg (172 lb 9.6 oz)   SpO2 98%   BMI 22.77 kg/m    Body mass index is 22.77 kg/m .  Physical Exam  Constitutional:       Appearance: He is well-developed.   HENT:      Right Ear: External ear normal.      Left Ear: External ear normal.   Eyes:      General: No scleral icterus.     Conjunctiva/sclera: Conjunctivae normal.   Cardiovascular:      Rate and Rhythm: Normal rate.   Pulmonary:      Effort: Pulmonary effort is normal. No respiratory distress.   Skin:     Findings: No rash.   Neurological:      Mental Status: He is alert.            XR: no rib fracture noted         "

## 2022-10-03 ENCOUNTER — OFFICE VISIT (OUTPATIENT)
Dept: ORTHOPEDICS | Facility: OTHER | Age: 32
End: 2022-10-03
Attending: ORTHOPAEDIC SURGERY
Payer: COMMERCIAL

## 2022-10-03 DIAGNOSIS — M51.369 DDD (DEGENERATIVE DISC DISEASE), LUMBAR: Primary | ICD-10-CM

## 2022-10-03 DIAGNOSIS — G89.29 CHRONIC LEFT SHOULDER PAIN: Primary | ICD-10-CM

## 2022-10-03 DIAGNOSIS — M25.512 CHRONIC LEFT SHOULDER PAIN: Primary | ICD-10-CM

## 2022-10-03 PROCEDURE — G0463 HOSPITAL OUTPT CLINIC VISIT: HCPCS

## 2022-10-03 PROCEDURE — 20610 DRAIN/INJ JOINT/BURSA W/O US: CPT | Mod: LT

## 2022-10-03 PROCEDURE — 250N000009 HC RX 250: Performed by: ORTHOPAEDIC SURGERY

## 2022-10-03 PROCEDURE — 20610 DRAIN/INJ JOINT/BURSA W/O US: CPT | Mod: LT | Performed by: ORTHOPAEDIC SURGERY

## 2022-10-03 PROCEDURE — 250N000011 HC RX IP 250 OP 636: Performed by: ORTHOPAEDIC SURGERY

## 2022-10-03 RX ORDER — TRIAMCINOLONE ACETONIDE 40 MG/ML
40 INJECTION, SUSPENSION INTRA-ARTICULAR; INTRAMUSCULAR ONCE
Status: COMPLETED | OUTPATIENT
Start: 2022-10-03 | End: 2022-10-03

## 2022-10-03 RX ORDER — LIDOCAINE HYDROCHLORIDE 10 MG/ML
4 INJECTION, SOLUTION EPIDURAL; INFILTRATION; INTRACAUDAL; PERINEURAL ONCE
Status: COMPLETED | OUTPATIENT
Start: 2022-10-03 | End: 2022-10-03

## 2022-10-03 RX ADMIN — TRIAMCINOLONE ACETONIDE 40 MG: 40 INJECTION, SUSPENSION INTRA-ARTICULAR; INTRAMUSCULAR at 14:47

## 2022-10-03 RX ADMIN — LIDOCAINE HYDROCHLORIDE 4 ML: 10 INJECTION, SOLUTION EPIDURAL; INFILTRATION; INTRACAUDAL; PERINEURAL at 14:47

## 2022-10-03 NOTE — PROGRESS NOTES
A steroid injection was performed at L GH joint using 1% plain Lidocaine and 40 mg of Kenalog. This was well tolerated.

## 2022-10-03 NOTE — PROGRESS NOTES
Visit Date: 10/03/2022    He is a 32-year-old gentleman with left shoulder pain.  He has known glenohumeral arthritis.  We went ahead and gave him an injection into his shoulder back in 2022.  It has lasted him almost 6 months now and he is doing really pretty good with it.  He is interested in having another injection at this point because his pain is kind of returning at this point.    PHYSICAL EXAMINATION:  He has a poor external rotation on that side secondary to the arthritis, otherwise neuro and vascularly intact.    IMPRESSION AND PLAN:  This is a 32-year-old gentleman with glenohumeral arthritis of his left shoulder.  He was given an injection into the glenohumeral joint today.  He tolerated it well and had good relief of his pain.  We are going to see him back on an as-needed basis for this.    Riki Woo MD        D: 10/03/2022   T: 10/03/2022   MT: VALARIE    Name:     QING SOSA  MRN:      [id number]        Account:    [id number]   :      1990           Visit Date: 10/03/2022     Document: [id number]

## 2023-04-11 ENCOUNTER — OFFICE VISIT (OUTPATIENT)
Dept: FAMILY MEDICINE | Facility: OTHER | Age: 33
End: 2023-04-11
Payer: COMMERCIAL

## 2023-04-11 VITALS
WEIGHT: 188 LBS | HEART RATE: 75 BPM | SYSTOLIC BLOOD PRESSURE: 116 MMHG | BODY MASS INDEX: 23.38 KG/M2 | HEIGHT: 75 IN | OXYGEN SATURATION: 96 % | TEMPERATURE: 98.3 F | RESPIRATION RATE: 16 BRPM | DIASTOLIC BLOOD PRESSURE: 70 MMHG

## 2023-04-11 DIAGNOSIS — J32.0 MAXILLARY SINUSITIS, UNSPECIFIED CHRONICITY: Primary | ICD-10-CM

## 2023-04-11 DIAGNOSIS — J06.9 VIRAL URI: ICD-10-CM

## 2023-04-11 LAB — SARS-COV-2 RNA RESP QL NAA+PROBE: NEGATIVE

## 2023-04-11 PROCEDURE — U0003 INFECTIOUS AGENT DETECTION BY NUCLEIC ACID (DNA OR RNA); SEVERE ACUTE RESPIRATORY SYNDROME CORONAVIRUS 2 (SARS-COV-2) (CORONAVIRUS DISEASE [COVID-19]), AMPLIFIED PROBE TECHNIQUE, MAKING USE OF HIGH THROUGHPUT TECHNOLOGIES AS DESCRIBED BY CMS-2020-01-R: HCPCS | Mod: ZL | Performed by: NURSE PRACTITIONER

## 2023-04-11 PROCEDURE — G0463 HOSPITAL OUTPT CLINIC VISIT: HCPCS

## 2023-04-11 PROCEDURE — 99213 OFFICE O/P EST LOW 20 MIN: CPT | Mod: CS | Performed by: NURSE PRACTITIONER

## 2023-04-11 PROCEDURE — C9803 HOPD COVID-19 SPEC COLLECT: HCPCS | Performed by: NURSE PRACTITIONER

## 2023-04-11 RX ORDER — IBUPROFEN 600 MG/1
TABLET, FILM COATED ORAL
COMMUNITY
Start: 2022-12-21 | End: 2023-04-12

## 2023-04-11 ASSESSMENT — PAIN SCALES - GENERAL: PAINLEVEL: NO PAIN (0)

## 2023-04-11 NOTE — NURSING NOTE
"Pt presents to  for sinus infection x6 days. Pt has tried several different otc cold medications, and none have given relief. No pain, just a lot of pressure in head.    Chief Complaint   Patient presents with     Sinus Problem       FOOD SECURITY SCREENING QUESTIONS  Hunger Vital Signs:  Within the past 12 months we worried whether our food would run out before we got money to buy more. Never  Within the past 12 months the food we bought just didn't last and we didn't have money to get more. Never  Dianadomenico Bullockon 4/11/2023 9:53 AM      Initial /70 (BP Location: Right arm, Patient Position: Sitting, Cuff Size: Adult Large)   Pulse 75   Temp 98.3  F (36.8  C) (Temporal)   Resp 16   Ht 1.905 m (6' 3\")   Wt 85.3 kg (188 lb)   SpO2 96%   BMI 23.50 kg/m   Estimated body mass index is 23.5 kg/m  as calculated from the following:    Height as of this encounter: 1.905 m (6' 3\").    Weight as of this encounter: 85.3 kg (188 lb).  Medication Reconciliation: complete    Dianadomenico Alonso    "

## 2023-04-11 NOTE — PROGRESS NOTES
"ASSESSMENT/PLAN:    I have reviewed the nursing notes.  I have reviewed the findings, diagnosis, plan and need for follow up with the patient.    1. Maxillary sinusitis, unspecified chronicity  X6 days     2. Viral URI  - Symptomatic COVID-19 Virus (Coronavirus) by PCR Nose  Negative COVID test.    Has had sinus symptoms for 6 days.  Discussed with patient this is generally caused by viral infections and resolve or at least started to improve spontaneously within 10 or so days.  If worsening or persistent condition beyond 10 to 14 days, sometimes an antibiotics indicated for sinus infections.  Patient was very understanding and receptive that no antibiotics are warranted at this time and will continue with over-the-counter medicines as well as try nasal rinse/[place] pot or sinus rinse.  He will return to [facility] if he has ongoing concerns or worsening condition.  -Symptomatic treatment - Encouraged fluids, salt water gargles, honey (only if greater than 1 year in age due to risk of botulism), elevation, humidifier, sinus rinse/netti pot, lozenges, tea, topical vapor rub, popsicles, rest, etc   -May use over-the-counter Tylenol or ibuprofen PRN    Discussed warning signs/symptoms indicative of need to f/u    Follow up if symptoms persist or worsen or concerns    I explained my diagnostic considerations and recommendations to the patient, who voiced understanding and agreement with the treatment plan. All questions were answered. We discussed potential side effects of any prescribed or recommended therapies, as well as expectations for response to treatments.    Mayda Peck NP  4/11/2023  10:02 AM    HPI:  Lance Pacheco is a 33 year old male  who [facility] for concerns of for sinus infection x6 days. Eyes swollen, green drainage from nose that is \"never ending.\" no significant history of sinus infections in the past. Somewhat sore throat. No fevers.  Pt has tried several different otc " "cold medications, and none have given relief. No pain, just a lot of pressure in head. Some ear pain. Daughter sick from being at day care. Did not test for covid at all since symptoms started.     ROS otherwise negative.     Past Medical History:   Diagnosis Date     Depressive disorder      Night terrors      Sleep disorder      Substance abuse (H)     Treatment right before 12/25/12.     Past Surgical History:   Procedure Laterality Date     ENT SURGERY  2009    abcessed tooth     ORTHOPEDIC SURGERY  2010    Right wrist     Social History     Tobacco Use     Smoking status: Every Day     Packs/day: 1.00     Types: Cigarettes     Smokeless tobacco: Former     Types: Chew     Tobacco comments:     tin lasts a week   Vaping Use     Vaping status: Former     Substances: Nicotine, Flavoring     Passive vaping exposure: Yes   Substance Use Topics     Alcohol use: Not Currently     Comment: Occasional  2 drinks per week     Current Outpatient Medications   Medication Sig Dispense Refill     ibuprofen (ADVIL/MOTRIN) 600 MG tablet        medical cannabis (Patient's own supply) See Admin Instructions (The purpose of this order is to document that the patient reports taking medical cannabis.  This is not a prescription, and is not used to certify that the patient has a qualifying medical condition.)       naproxen (NAPROSYN) 500 MG tablet Take 1 tablet (500 mg) by mouth 2 times daily (with meals) (Patient not taking: Reported on 4/11/2023) 20 tablet 0     No Known Allergies  Past medical history, past surgical history, current medications and allergies reviewed and accurate to the best of my knowledge.      ROS:  Refer to HPI    /70 (BP Location: Right arm, Patient Position: Sitting, Cuff Size: Adult Large)   Pulse 75   Temp 98.3  F (36.8  C) (Temporal)   Resp 16   Ht 1.905 m (6' 3\")   Wt 85.3 kg (188 lb)   SpO2 96%   BMI 23.50 kg/m      EXAM:  General Appearance: Well appearing 33 year old male, appropriate " appearance for age. No acute distress   Ears: Left TM intact, translucent with bony landmarks appreciated, no erythema, no effusion, no bulging, no purulence.  Right TM intact, translucent with bony landmarks appreciated, no erythema, no effusion, no bulging, no purulence.  Left auditory canal clear.  Right auditory canal clear.  Normal external ears, non tender.  Eyes: conjunctivae normal without erythema or irritation, corneas clear, no drainage or crusting, no eyelid swelling, pupils equal   Oropharynx: moist mucous membranes, posterior pharynx with erythema, tonsils symmetric, + erythema, no exudates or petechiae, no post nasal drip seen, no trismus, voice clear.    Sinuses:  No significant sinus tenderness upon palpation of the frontal or maxillary sinuses  Nose:  Bilateral nares: + erythema, no edema, + green drainage + congestion   Neck: supple without adenopathy  Respiratory: normal chest wall and respirations.  Normal effort.  Clear to auscultation bilaterally, no wheezing, crackles or rhonchi.  No increased work of breathing.  + rare nonproductive cough appreciated.  Cardiac: RRR with no murmurs  Psychological: normal affect, alert, oriented, and pleasant.     Results for orders placed or performed in visit on 04/11/23   Symptomatic COVID-19 Virus (Coronavirus) by PCR Nose     Status: Normal    Specimen: Nose; Swab   Result Value Ref Range    SARS CoV2 PCR Negative Negative    Narrative    Testing was performed using the Xpert Xpress SARS-CoV-2 Assay on the Cepheid Gene-Xpert Instrument Systems. Additional information about this Emergency Use Authorization (EUA) assay can be found via the Lab Guide. This test should be ordered for the detection of SARS-CoV-2 in individuals who meet SARS-CoV-2 clinical and/or epidemiological criteria as well as from individuals without symptoms or other reasons to suspect COVID-19. Test performance for asymptomatic patients has only been established in anterior nasal swab  specimens. This test is for in vitro diagnostic use under the FDA EUA for laboratories certified under CLIA to perform high complexity testing. This test has not been FDA cleared or approved. A negative result does not rule out the presence of PCR inhibitors in the specimen or target RNA concentration below the limit of detection for the assay. The possibility of a false negative should be considered if the patient's recent exposure or clinical presentation suggests COVID-19. This test was validated by [facility]. This laboratory is certified under the Clinical Laboratory Improvement Amendments (CLIA) as qualified to perform high complexity clinical laboratory testing.

## 2023-04-12 ENCOUNTER — OFFICE VISIT (OUTPATIENT)
Dept: FAMILY MEDICINE | Facility: OTHER | Age: 33
End: 2023-04-12
Attending: STUDENT IN AN ORGANIZED HEALTH CARE EDUCATION/TRAINING PROGRAM
Payer: COMMERCIAL

## 2023-04-12 VITALS
RESPIRATION RATE: 18 BRPM | DIASTOLIC BLOOD PRESSURE: 70 MMHG | OXYGEN SATURATION: 96 % | HEIGHT: 75 IN | BODY MASS INDEX: 23.41 KG/M2 | TEMPERATURE: 97.6 F | SYSTOLIC BLOOD PRESSURE: 124 MMHG | HEART RATE: 61 BPM | WEIGHT: 188.25 LBS

## 2023-04-12 DIAGNOSIS — B96.89 ACUTE BACTERIAL SINUSITIS: Primary | ICD-10-CM

## 2023-04-12 DIAGNOSIS — J01.90 ACUTE BACTERIAL SINUSITIS: Primary | ICD-10-CM

## 2023-04-12 PROCEDURE — 99213 OFFICE O/P EST LOW 20 MIN: CPT | Performed by: STUDENT IN AN ORGANIZED HEALTH CARE EDUCATION/TRAINING PROGRAM

## 2023-04-12 PROCEDURE — G0463 HOSPITAL OUTPT CLINIC VISIT: HCPCS

## 2023-04-12 RX ORDER — OXYMETAZOLINE HYDROCHLORIDE 0.05 G/100ML
2 SPRAY NASAL 2 TIMES DAILY
Qty: 15 ML | Refills: 1 | Status: SHIPPED | OUTPATIENT
Start: 2023-04-12 | End: 2023-04-15

## 2023-04-12 RX ORDER — FLUTICASONE PROPIONATE 50 MCG
1 SPRAY, SUSPENSION (ML) NASAL DAILY
Qty: 11 ML | Refills: 1 | Status: SHIPPED | OUTPATIENT
Start: 2023-04-12

## 2023-04-12 ASSESSMENT — PAIN SCALES - GENERAL: PAINLEVEL: MODERATE PAIN (5)

## 2023-04-12 NOTE — PROGRESS NOTES
"  Assessment & Plan     Acute bacterial sinusitis  Symptoms, exam, and timeline likely sinusitis. discussed treatment options. Patient is ok with Flonase as well as abx. Reviewed benefits vs risks and side effects of medication and patient in agreement to start taking.   - amoxicillin-clavulanate (AUGMENTIN) 875-125 MG tablet; Take 1 tablet by mouth 2 times daily for 10 days  - fluticasone (FLONASE) 50 MCG/ACT nasal spray; Spray 1 spray into both nostrils daily  - oxymetazoline (AFRIN) 0.05 % nasal spray; Spray 2 sprays into both nostrils 2 times daily for 3 days                 No follow-ups on file.    Jhonny Padilla MD  [facility] [place]    Jd Lucas is a 33 year old, presenting for the following health issues:  Sinus Problem (Drainage, congestion, bilateral ear pain, dizziness x 1 week)         View : No data to display.              HPI     Sinus pain  - [facility] in [facility], covid negative. Had cold symptoms at the time   - pain and pressure today in sinuses  - taking nyquil and dayquil, antihistamines without relief   - cold symptoms started over a week ago             Review of Systems   Constitutional, HEENT, cardiovascular, pulmonary, gi and gu systems are negative, except as otherwise noted.      Objective    /70 (BP Location: Right arm, Patient Position: Sitting, Cuff Size: Adult Regular)   Pulse 61   Temp 97.6  F (36.4  C) (Tympanic)   Resp 18   Ht 1.905 m (6' 3\")   Wt 85.4 kg (188 lb 4 oz)   SpO2 96%   BMI 23.53 kg/m    Body mass index is 23.53 kg/m .  Physical Exam   GENERAL: healthy, alert and no distress  EYES: Eyes grossly normal to inspection, PERRL and conjunctivae and sclerae normal  HENT: normal cephalic/atraumatic, both ears: retracted TMs, nasal mucosa edematous , oropharynx clear and oral mucous membranes moist. Sinuses tenderness to palpation frontal and bilateral maxillary   RESP: lungs clear to auscultation - no rales, rhonchi or " wheezes  CV: regular rate and rhythm, normal S1 S2, no S3 or S4, no murmur, click or rub, no peripheral edema and peripheral pulses strong

## 2023-04-12 NOTE — NURSING NOTE
Patient presents to clinic experiencing sinus drainage, congestion, bilateral ear pain, dizziness x 1 week.  Was seen yesterday and had negative Covid test.    Medication Rec Complete  Diana Castano LPN............4/12/2023 8:26 AM

## 2023-04-12 NOTE — PATIENT INSTRUCTIONS
Use Affrin nasal spray twice a day for a max of 3 days  Use flonase daily   Take augmentin twice a day for 10 days

## 2023-04-17 ENCOUNTER — OFFICE VISIT (OUTPATIENT)
Dept: ORTHOPEDICS | Facility: OTHER | Age: 33
End: 2023-04-17
Attending: ORTHOPAEDIC SURGERY
Payer: COMMERCIAL

## 2023-04-17 DIAGNOSIS — M25.512 CHRONIC LEFT SHOULDER PAIN: Primary | ICD-10-CM

## 2023-04-17 DIAGNOSIS — G89.29 CHRONIC LEFT SHOULDER PAIN: Primary | ICD-10-CM

## 2023-04-17 PROCEDURE — 20610 DRAIN/INJ JOINT/BURSA W/O US: CPT | Mod: LT | Performed by: ORTHOPAEDIC SURGERY

## 2023-04-17 PROCEDURE — 250N000011 HC RX IP 250 OP 636: Performed by: ORTHOPAEDIC SURGERY

## 2023-04-17 PROCEDURE — G0463 HOSPITAL OUTPT CLINIC VISIT: HCPCS | Mod: 25

## 2023-04-17 PROCEDURE — 250N000009 HC RX 250: Performed by: ORTHOPAEDIC SURGERY

## 2023-04-17 RX ORDER — TRIAMCINOLONE ACETONIDE 40 MG/ML
40 INJECTION, SUSPENSION INTRA-ARTICULAR; INTRAMUSCULAR ONCE
Status: COMPLETED | OUTPATIENT
Start: 2023-04-17 | End: 2023-04-17

## 2023-04-17 RX ORDER — LIDOCAINE HYDROCHLORIDE 10 MG/ML
4 INJECTION, SOLUTION EPIDURAL; INFILTRATION; INTRACAUDAL; PERINEURAL ONCE
Status: COMPLETED | OUTPATIENT
Start: 2023-04-17 | End: 2023-04-17

## 2023-04-17 RX ADMIN — LIDOCAINE HYDROCHLORIDE 4 ML: 10 INJECTION, SOLUTION INFILTRATION; PERINEURAL at 11:20

## 2023-04-17 RX ADMIN — TRIAMCINOLONE ACETONIDE 40 MG: 40 INJECTION, SUSPENSION INTRA-ARTICULAR; INTRAMUSCULAR at 11:20

## 2023-04-17 NOTE — PROGRESS NOTES
Visit Date: 2023    He comes in for left shoulder pain.  He has known glenohumeral arthritis.  He made it about 4 months after the last injection into the shoulder joint and he has done very well with it.    On examination, he has full range of motion of his shoulder.  No scarring about the shoulder.  No tattoos.  Good range of motion and good strength.  He is neuro and vascularly intact.    An injection was given into his left shoulder today.  He tolerated it well and he had good relief of his pain.  We are going to see him back on an as-needed basis.  We did discuss biologic resurfacing of the glenoid with a total shoulder arthroplasty briefly.    Riki Woo MD        D: 2023   T: 2023   MT: [email or web address]    Name:     QING SOSA  MRN:      [id number]        Account:    [id number]   :      1990           Visit Date: 2023     Document: [id number]

## 2023-04-17 NOTE — PROGRESS NOTES
Patient is here for follow up on his left shoulder pain.  Tricia Balbuena LPN .....................4/17/2023 11:15 AM

## 2023-09-07 NOTE — PROGRESS NOTES
Visit Date: 04/18/2022    Lance is a 32-year-old gentleman with significant left shoulder pain.  He has a history of instability in the left shoulder. Recently over the last year stopped dislocating his shoulder, but now has quite a bit more pain in the shoulder since the dislocations stopped.  He has known Samilson-Haas 3 glenohumeral arthritis and, despite his young age, at this point is really having difficulty with the osteoarthritis.  He recently saw Dr. King over at [facility] in [place], who gave him an injection into the shoulder from a posterior approach.  This gave him very little relief in the shoulder that only lasted a couple of days and did not even take care of all of his pain.  He was quite disappointed with this.  They also recommended that he start physical therapy even though he is actually an avid and very active gym goer.      PHYSICAL EXAMINATION:  Today, he actually has a relatively good range of motion of his shoulder.  He can raise it up to about 160 degrees.  He has external rotation of 30 degrees compared to 45 on the right.    Otherwise, has good strength in the rotator cuff and he is neuro and vascularly intact.    IMAGING:  X-ray examination of the shoulder shows Samilson-Haas 3 glenohumeral arthritis.    IMPRESSION AND PLAN:  This is a 32-year-old gentleman with Samilson-Haas 3 glenohumeral arthritis of his left shoulder.  We went ahead and gave him an injection into the shoulder today, utilizing an anterior approach.  He said this felt completely different than the injection that he had done by Dr. King and he had quite a bit more luzma that this was going to relieve his pain.  We are going to see him back on an as-needed basis for this if his pain returns.  He understands that at some point here he is going to need a total shoulder done. Hopefully this will buy him quite a bit of relief for quite some time. We also had a brief discussion today regarding a  biologic resurfacing of the glenoid, which is something that may benefit him given his youth at this time.    Riki Woo MD        D: 2022   T: 2022   MT: [id number]    Name:     QING SOSA  MRN:      [id number]        Account:    [id number]   :      1990           Visit Date: 2022     Document: [id number]   Laser Type: Vbeam 595nm

## 2024-03-14 NOTE — NURSING NOTE
Pt states that he saw Analiajaja Cadet 6/13, had xrays. Has been seeing chiropractor, today very sore and swollen   Stable.  Continue daily statin

## (undated) RX ORDER — TRIAMCINOLONE ACETONIDE 40 MG/ML
INJECTION, SUSPENSION INTRA-ARTICULAR; INTRAMUSCULAR
Status: DISPENSED
Start: 2022-04-18

## (undated) RX ORDER — LIDOCAINE HYDROCHLORIDE 10 MG/ML
INJECTION, SOLUTION EPIDURAL; INFILTRATION; INTRACAUDAL; PERINEURAL
Status: DISPENSED
Start: 2022-10-03

## (undated) RX ORDER — TRIAMCINOLONE ACETONIDE 40 MG/ML
INJECTION, SUSPENSION INTRA-ARTICULAR; INTRAMUSCULAR
Status: DISPENSED
Start: 2022-10-03

## (undated) RX ORDER — BUPIVACAINE HYDROCHLORIDE AND EPINEPHRINE 5; 5 MG/ML; UG/ML
INJECTION, SOLUTION PERINEURAL
Status: DISPENSED
Start: 2022-08-27

## (undated) RX ORDER — TRIAMCINOLONE ACETONIDE 40 MG/ML
INJECTION, SUSPENSION INTRA-ARTICULAR; INTRAMUSCULAR
Status: DISPENSED
Start: 2023-04-17

## (undated) RX ORDER — LIDOCAINE HYDROCHLORIDE 10 MG/ML
INJECTION, SOLUTION EPIDURAL; INFILTRATION; INTRACAUDAL; PERINEURAL
Status: DISPENSED
Start: 2022-04-18

## (undated) RX ORDER — LIDOCAINE HYDROCHLORIDE 10 MG/ML
INJECTION, SOLUTION INFILTRATION; PERINEURAL
Status: DISPENSED
Start: 2023-04-17